# Patient Record
Sex: MALE | Race: WHITE
[De-identification: names, ages, dates, MRNs, and addresses within clinical notes are randomized per-mention and may not be internally consistent; named-entity substitution may affect disease eponyms.]

---

## 2018-10-03 ENCOUNTER — HOSPITAL ENCOUNTER (INPATIENT)
Dept: HOSPITAL 94 - ER | Age: 73
LOS: 6 days | Discharge: HOME | DRG: 329 | End: 2018-10-09
Attending: HOSPITALIST | Admitting: SPECIALIST
Payer: MEDICARE

## 2018-10-03 VITALS — HEIGHT: 67 IN | WEIGHT: 150.31 LBS | BODY MASS INDEX: 23.59 KG/M2

## 2018-10-03 DIAGNOSIS — K56.690: ICD-10-CM

## 2018-10-03 DIAGNOSIS — K56.7: ICD-10-CM

## 2018-10-03 DIAGNOSIS — G40.909: ICD-10-CM

## 2018-10-03 DIAGNOSIS — Z79.82: ICD-10-CM

## 2018-10-03 DIAGNOSIS — J44.9: ICD-10-CM

## 2018-10-03 DIAGNOSIS — Z88.8: ICD-10-CM

## 2018-10-03 DIAGNOSIS — N18.9: ICD-10-CM

## 2018-10-03 DIAGNOSIS — I71.4: ICD-10-CM

## 2018-10-03 DIAGNOSIS — Z71.6: ICD-10-CM

## 2018-10-03 DIAGNOSIS — K55.039: Primary | ICD-10-CM

## 2018-10-03 DIAGNOSIS — I12.9: ICD-10-CM

## 2018-10-03 DIAGNOSIS — F41.9: ICD-10-CM

## 2018-10-03 DIAGNOSIS — F17.200: ICD-10-CM

## 2018-10-03 DIAGNOSIS — E43: ICD-10-CM

## 2018-10-03 PROCEDURE — 86304 IMMUNOASSAY TUMOR CA 125: CPT

## 2018-10-03 PROCEDURE — 93005 ELECTROCARDIOGRAM TRACING: CPT

## 2018-10-03 PROCEDURE — 82378 CARCINOEMBRYONIC ANTIGEN: CPT

## 2018-10-03 PROCEDURE — 96374 THER/PROPH/DIAG INJ IV PUSH: CPT

## 2018-10-03 PROCEDURE — 85610 PROTHROMBIN TIME: CPT

## 2018-10-03 PROCEDURE — 99285 EMERGENCY DEPT VISIT HI MDM: CPT

## 2018-10-03 PROCEDURE — 88307 TISSUE EXAM BY PATHOLOGIST: CPT

## 2018-10-03 PROCEDURE — 36415 COLL VENOUS BLD VENIPUNCTURE: CPT

## 2018-10-03 PROCEDURE — 80053 COMPREHEN METABOLIC PANEL: CPT

## 2018-10-03 PROCEDURE — 87070 CULTURE OTHR SPECIMN AEROBIC: CPT

## 2018-10-03 PROCEDURE — 96375 TX/PRO/DX INJ NEW DRUG ADDON: CPT

## 2018-10-03 PROCEDURE — 85730 THROMBOPLASTIN TIME PARTIAL: CPT

## 2018-10-03 PROCEDURE — 85025 COMPLETE CBC W/AUTO DIFF WBC: CPT

## 2018-10-03 PROCEDURE — 71045 X-RAY EXAM CHEST 1 VIEW: CPT

## 2018-10-04 VITALS — DIASTOLIC BLOOD PRESSURE: 86 MMHG | SYSTOLIC BLOOD PRESSURE: 136 MMHG

## 2018-10-04 VITALS — DIASTOLIC BLOOD PRESSURE: 72 MMHG | SYSTOLIC BLOOD PRESSURE: 130 MMHG

## 2018-10-04 VITALS — DIASTOLIC BLOOD PRESSURE: 81 MMHG | SYSTOLIC BLOOD PRESSURE: 111 MMHG

## 2018-10-04 VITALS — DIASTOLIC BLOOD PRESSURE: 86 MMHG | SYSTOLIC BLOOD PRESSURE: 150 MMHG

## 2018-10-04 VITALS — SYSTOLIC BLOOD PRESSURE: 140 MMHG | DIASTOLIC BLOOD PRESSURE: 87 MMHG

## 2018-10-04 VITALS — SYSTOLIC BLOOD PRESSURE: 149 MMHG | DIASTOLIC BLOOD PRESSURE: 84 MMHG

## 2018-10-04 VITALS — SYSTOLIC BLOOD PRESSURE: 132 MMHG | DIASTOLIC BLOOD PRESSURE: 77 MMHG

## 2018-10-04 VITALS — SYSTOLIC BLOOD PRESSURE: 140 MMHG | DIASTOLIC BLOOD PRESSURE: 84 MMHG

## 2018-10-04 VITALS — DIASTOLIC BLOOD PRESSURE: 77 MMHG | SYSTOLIC BLOOD PRESSURE: 104 MMHG

## 2018-10-04 VITALS — SYSTOLIC BLOOD PRESSURE: 156 MMHG | DIASTOLIC BLOOD PRESSURE: 88 MMHG

## 2018-10-04 VITALS — DIASTOLIC BLOOD PRESSURE: 88 MMHG | SYSTOLIC BLOOD PRESSURE: 153 MMHG

## 2018-10-04 VITALS — SYSTOLIC BLOOD PRESSURE: 127 MMHG | DIASTOLIC BLOOD PRESSURE: 77 MMHG

## 2018-10-04 VITALS — DIASTOLIC BLOOD PRESSURE: 86 MMHG | SYSTOLIC BLOOD PRESSURE: 156 MMHG

## 2018-10-04 VITALS — DIASTOLIC BLOOD PRESSURE: 56 MMHG | SYSTOLIC BLOOD PRESSURE: 103 MMHG

## 2018-10-04 VITALS — SYSTOLIC BLOOD PRESSURE: 129 MMHG | DIASTOLIC BLOOD PRESSURE: 77 MMHG

## 2018-10-04 VITALS — SYSTOLIC BLOOD PRESSURE: 92 MMHG | DIASTOLIC BLOOD PRESSURE: 62 MMHG

## 2018-10-04 VITALS — DIASTOLIC BLOOD PRESSURE: 87 MMHG | SYSTOLIC BLOOD PRESSURE: 151 MMHG

## 2018-10-04 VITALS — DIASTOLIC BLOOD PRESSURE: 93 MMHG | SYSTOLIC BLOOD PRESSURE: 158 MMHG

## 2018-10-04 LAB
ALBUMIN SERPL BCP-MCNC: 2.8 G/DL (ref 3.4–5)
ALBUMIN/GLOB SERPL: 0.9 {RATIO} (ref 1.1–1.5)
ALP SERPL-CCNC: 65 IU/L (ref 46–116)
ALT SERPL W P-5'-P-CCNC: 14 U/L (ref 12–78)
ANION GAP SERPL CALCULATED.3IONS-SCNC: 10 MMOL/L (ref 8–16)
APTT PPP: 29 SECONDS (ref 22–32)
AST SERPL W P-5'-P-CCNC: 13 U/L (ref 10–37)
BASOPHILS # BLD AUTO: 0.1 X10'3 (ref 0–0.2)
BASOPHILS NFR BLD AUTO: 0.5 % (ref 0–1)
BILIRUB SERPL-MCNC: 0.5 MG/DL (ref 0.1–1)
BUN SERPL-MCNC: 11 MG/DL (ref 7–18)
BUN/CREAT SERPL: 10.4 (ref 5.4–32)
CALCIUM SERPL-MCNC: 7.9 MG/DL (ref 8.5–10.1)
CHLORIDE SERPL-SCNC: 111 MMOL/L (ref 99–107)
CO2 SERPL-SCNC: 25.4 MMOL/L (ref 24–32)
CREAT SERPL-MCNC: 1.06 MG/DL (ref 0.6–1.1)
EOSINOPHIL # BLD AUTO: 0.1 X10'3 (ref 0–0.9)
EOSINOPHIL NFR BLD AUTO: 1.2 % (ref 0–6)
ERYTHROCYTE [DISTWIDTH] IN BLOOD BY AUTOMATED COUNT: 13.4 % (ref 11.5–14.5)
GFR SERPL CREATININE-BSD FRML MDRD: 69 ML/MIN
GLUCOSE SERPL-MCNC: 83 MG/DL (ref 70–104)
HCT VFR BLD AUTO: 41.6 % (ref 42–52)
HGB BLD-MCNC: 14.4 G/DL (ref 14–17.9)
INR PPP: 1 INR
LYMPHOCYTES # BLD AUTO: 2 X10'3 (ref 1.1–4.8)
LYMPHOCYTES NFR BLD AUTO: 17.6 % (ref 21–51)
MCH RBC QN AUTO: 32.8 PG (ref 27–31)
MCHC RBC AUTO-ENTMCNC: 34.6 % (ref 33–36.5)
MCV RBC AUTO: 94.7 FL (ref 78–98)
MONOCYTES # BLD AUTO: 1 X10'3 (ref 0–0.9)
MONOCYTES NFR BLD AUTO: 8.9 % (ref 2–12)
NEUTROPHILS # BLD AUTO: 8.1 X10'3 (ref 1.8–7.7)
NEUTROPHILS NFR BLD AUTO: 71.8 % (ref 42–75)
PLATELET # BLD AUTO: 246 X10'3 (ref 140–440)
PMV BLD AUTO: 8.2 FL (ref 7.4–10.4)
POTASSIUM SERPL-SCNC: 4.2 MMOL/L (ref 3.5–5.1)
PROT SERPL-MCNC: 5.9 G/DL (ref 6.4–8.2)
PROTHROMBIN TIME: 10.6 SECONDS (ref 9–12)
RBC # BLD AUTO: 4.39 X10'6 (ref 4.7–6.1)
SODIUM SERPL-SCNC: 146 MMOL/L (ref 135–145)
WBC # BLD AUTO: 11.3 X10'3 (ref 4.5–11)

## 2018-10-04 PROCEDURE — 07TB0ZZ RESECTION OF MESENTERIC LYMPHATIC, OPEN APPROACH: ICD-10-PCS | Performed by: SURGERY

## 2018-10-04 PROCEDURE — 0DTF0ZZ RESECTION OF RIGHT LARGE INTESTINE, OPEN APPROACH: ICD-10-PCS | Performed by: SURGERY

## 2018-10-04 RX ADMIN — SODIUM CHLORIDE SCH MLS/HR: 9 INJECTION INTRAMUSCULAR; INTRAVENOUS; SUBCUTANEOUS at 18:14

## 2018-10-04 RX ADMIN — TAZOBACTAM SODIUM AND PIPERACILLIN SODIUM SCH MLS/HR: 250; 2 INJECTION, SOLUTION INTRAVENOUS at 16:32

## 2018-10-04 RX ADMIN — DIVALPROEX SODIUM SCH MG: 250 TABLET, DELAYED RELEASE ORAL at 07:55

## 2018-10-04 RX ADMIN — TAZOBACTAM SODIUM AND PIPERACILLIN SODIUM SCH MLS/HR: 250; 2 INJECTION, SOLUTION INTRAVENOUS at 07:42

## 2018-10-04 RX ADMIN — HYDROMORPHONE HYDROCHLORIDE PRN MG: 1 INJECTION, SOLUTION INTRAMUSCULAR; INTRAVENOUS; SUBCUTANEOUS at 14:32

## 2018-10-04 RX ADMIN — SODIUM CHLORIDE SCH MLS/HR: 9 INJECTION INTRAMUSCULAR; INTRAVENOUS; SUBCUTANEOUS at 10:49

## 2018-10-04 RX ADMIN — NICOTINE SCH PATCH: 14 PATCH, EXTENDED RELEASE TRANSDERMAL at 21:21

## 2018-10-04 RX ADMIN — HYDROMORPHONE HYDROCHLORIDE PRN MG: 1 INJECTION, SOLUTION INTRAMUSCULAR; INTRAVENOUS; SUBCUTANEOUS at 14:43

## 2018-10-04 RX ADMIN — TAZOBACTAM SODIUM AND PIPERACILLIN SODIUM SCH MLS/HR: 250; 2 INJECTION, SOLUTION INTRAVENOUS at 00:46

## 2018-10-04 RX ADMIN — DOCUSATE SODIUM SCH MG: 100 CAPSULE, LIQUID FILLED ORAL at 21:21

## 2018-10-04 RX ADMIN — SODIUM CHLORIDE SCH MLS/HR: 9 INJECTION INTRAMUSCULAR; INTRAVENOUS; SUBCUTANEOUS at 00:46

## 2018-10-05 VITALS — DIASTOLIC BLOOD PRESSURE: 83 MMHG | SYSTOLIC BLOOD PRESSURE: 146 MMHG

## 2018-10-05 VITALS — SYSTOLIC BLOOD PRESSURE: 131 MMHG | DIASTOLIC BLOOD PRESSURE: 77 MMHG

## 2018-10-05 VITALS — DIASTOLIC BLOOD PRESSURE: 81 MMHG | SYSTOLIC BLOOD PRESSURE: 134 MMHG

## 2018-10-05 VITALS — SYSTOLIC BLOOD PRESSURE: 134 MMHG | DIASTOLIC BLOOD PRESSURE: 81 MMHG

## 2018-10-05 LAB
ALBUMIN SERPL BCP-MCNC: 2.8 G/DL (ref 3.4–5)
ALBUMIN/GLOB SERPL: 0.8 {RATIO} (ref 1.1–1.5)
ALP SERPL-CCNC: 66 IU/L (ref 46–116)
ALT SERPL W P-5'-P-CCNC: 21 U/L (ref 12–78)
ANION GAP SERPL CALCULATED.3IONS-SCNC: 12 MMOL/L (ref 8–16)
AST SERPL W P-5'-P-CCNC: 28 U/L (ref 10–37)
BASOPHILS # BLD AUTO: 0 X10'3 (ref 0–0.2)
BASOPHILS NFR BLD AUTO: 0.2 % (ref 0–1)
BILIRUB SERPL-MCNC: 0.5 MG/DL (ref 0.1–1)
BUN SERPL-MCNC: 13 MG/DL (ref 7–18)
BUN/CREAT SERPL: 11.7 (ref 5.4–32)
CALCIUM SERPL-MCNC: 8.2 MG/DL (ref 8.5–10.1)
CANCER ANTIGEN 125: 14.6 U/ML
CEA SERPL-MCNC: 2.6 NG/ML (ref 0–4.7)
CHLORIDE SERPL-SCNC: 108 MMOL/L (ref 99–107)
CO2 SERPL-SCNC: 22.8 MMOL/L (ref 24–32)
CREAT SERPL-MCNC: 1.11 MG/DL (ref 0.6–1.1)
EOSINOPHIL # BLD AUTO: 0 X10'3 (ref 0–0.9)
EOSINOPHIL NFR BLD AUTO: 0 % (ref 0–6)
ERYTHROCYTE [DISTWIDTH] IN BLOOD BY AUTOMATED COUNT: 13.1 % (ref 11.5–14.5)
GFR SERPL CREATININE-BSD FRML MDRD: 65 ML/MIN
GLUCOSE SERPL-MCNC: 97 MG/DL (ref 70–104)
HCT VFR BLD AUTO: 42 % (ref 42–52)
HGB BLD-MCNC: 14.7 G/DL (ref 14–17.9)
LYMPHOCYTES # BLD AUTO: 0.9 X10'3 (ref 1.1–4.8)
LYMPHOCYTES NFR BLD AUTO: 6.9 % (ref 21–51)
MCH RBC QN AUTO: 33.2 PG (ref 27–31)
MCHC RBC AUTO-ENTMCNC: 34.9 % (ref 33–36.5)
MCV RBC AUTO: 95 FL (ref 78–98)
MONOCYTES # BLD AUTO: 1.1 X10'3 (ref 0–0.9)
MONOCYTES NFR BLD AUTO: 8.3 % (ref 2–12)
NEUTROPHILS # BLD AUTO: 11.6 X10'3 (ref 1.8–7.7)
NEUTROPHILS NFR BLD AUTO: 84.6 % (ref 42–75)
PLATELET # BLD AUTO: 257 X10'3 (ref 140–440)
PMV BLD AUTO: 8.5 FL (ref 7.4–10.4)
POTASSIUM SERPL-SCNC: 4.5 MMOL/L (ref 3.5–5.1)
PROT SERPL-MCNC: 6.1 G/DL (ref 6.4–8.2)
RBC # BLD AUTO: 4.42 X10'6 (ref 4.7–6.1)
SODIUM SERPL-SCNC: 143 MMOL/L (ref 135–145)
WBC # BLD AUTO: 13.6 X10'3 (ref 4.5–11)

## 2018-10-05 RX ADMIN — MORPHINE SULFATE SCH MLS/HR: 50 INJECTION, SOLUTION, CONCENTRATE INTRAVENOUS at 09:00

## 2018-10-05 RX ADMIN — MORPHINE SULFATE SCH MLS/HR: 50 INJECTION, SOLUTION, CONCENTRATE INTRAVENOUS at 19:00

## 2018-10-05 RX ADMIN — MORPHINE SULFATE SCH MLS/HR: 50 INJECTION, SOLUTION, CONCENTRATE INTRAVENOUS at 01:00

## 2018-10-05 RX ADMIN — MORPHINE SULFATE SCH MLS/HR: 50 INJECTION, SOLUTION, CONCENTRATE INTRAVENOUS at 03:00

## 2018-10-05 RX ADMIN — MORPHINE SULFATE SCH MLS/HR: 50 INJECTION, SOLUTION, CONCENTRATE INTRAVENOUS at 15:00

## 2018-10-05 RX ADMIN — SODIUM CHLORIDE SCH MLS/HR: 9 INJECTION INTRAMUSCULAR; INTRAVENOUS; SUBCUTANEOUS at 09:03

## 2018-10-05 RX ADMIN — MORPHINE SULFATE SCH MLS/HR: 50 INJECTION, SOLUTION, CONCENTRATE INTRAVENOUS at 11:00

## 2018-10-05 RX ADMIN — MORPHINE SULFATE SCH MLS/HR: 50 INJECTION, SOLUTION, CONCENTRATE INTRAVENOUS at 23:00

## 2018-10-05 RX ADMIN — DOCUSATE SODIUM SCH MG: 100 CAPSULE, LIQUID FILLED ORAL at 21:05

## 2018-10-05 RX ADMIN — MORPHINE SULFATE SCH MLS/HR: 50 INJECTION, SOLUTION, CONCENTRATE INTRAVENOUS at 07:00

## 2018-10-05 RX ADMIN — TAZOBACTAM SODIUM AND PIPERACILLIN SODIUM SCH MLS/HR: 250; 2 INJECTION, SOLUTION INTRAVENOUS at 00:37

## 2018-10-05 RX ADMIN — MORPHINE SULFATE SCH MLS/HR: 50 INJECTION, SOLUTION, CONCENTRATE INTRAVENOUS at 17:00

## 2018-10-05 RX ADMIN — TAZOBACTAM SODIUM AND PIPERACILLIN SODIUM SCH MLS/HR: 250; 2 INJECTION, SOLUTION INTRAVENOUS at 23:49

## 2018-10-05 RX ADMIN — MORPHINE SULFATE SCH MLS/HR: 50 INJECTION, SOLUTION, CONCENTRATE INTRAVENOUS at 21:00

## 2018-10-05 RX ADMIN — TAZOBACTAM SODIUM AND PIPERACILLIN SODIUM SCH MLS/HR: 250; 2 INJECTION, SOLUTION INTRAVENOUS at 17:41

## 2018-10-05 RX ADMIN — MORPHINE SULFATE SCH MLS/HR: 50 INJECTION, SOLUTION, CONCENTRATE INTRAVENOUS at 13:00

## 2018-10-05 RX ADMIN — MORPHINE SULFATE SCH MLS/HR: 50 INJECTION, SOLUTION, CONCENTRATE INTRAVENOUS at 05:00

## 2018-10-05 RX ADMIN — TAZOBACTAM SODIUM AND PIPERACILLIN SODIUM SCH MLS/HR: 250; 2 INJECTION, SOLUTION INTRAVENOUS at 09:02

## 2018-10-05 RX ADMIN — NICOTINE SCH PATCH: 14 PATCH, EXTENDED RELEASE TRANSDERMAL at 09:03

## 2018-10-05 RX ADMIN — DOCUSATE SODIUM SCH MG: 100 CAPSULE, LIQUID FILLED ORAL at 09:01

## 2018-10-05 RX ADMIN — DIVALPROEX SODIUM SCH MG: 250 TABLET, DELAYED RELEASE ORAL at 09:02

## 2018-10-06 VITALS — DIASTOLIC BLOOD PRESSURE: 87 MMHG | SYSTOLIC BLOOD PRESSURE: 122 MMHG

## 2018-10-06 VITALS — DIASTOLIC BLOOD PRESSURE: 88 MMHG | SYSTOLIC BLOOD PRESSURE: 133 MMHG

## 2018-10-06 VITALS — DIASTOLIC BLOOD PRESSURE: 94 MMHG | SYSTOLIC BLOOD PRESSURE: 134 MMHG

## 2018-10-06 VITALS — DIASTOLIC BLOOD PRESSURE: 88 MMHG | SYSTOLIC BLOOD PRESSURE: 150 MMHG

## 2018-10-06 LAB
ALBUMIN SERPL BCP-MCNC: 2.7 G/DL (ref 3.4–5)
ALBUMIN/GLOB SERPL: 0.8 {RATIO} (ref 1.1–1.5)
ALP SERPL-CCNC: 61 IU/L (ref 46–116)
ALT SERPL W P-5'-P-CCNC: 19 U/L (ref 12–78)
ANION GAP SERPL CALCULATED.3IONS-SCNC: 10 MMOL/L (ref 8–16)
AST SERPL W P-5'-P-CCNC: 29 U/L (ref 10–37)
BASOPHILS # BLD AUTO: 0 X10'3 (ref 0–0.2)
BASOPHILS NFR BLD AUTO: 0.2 % (ref 0–1)
BILIRUB SERPL-MCNC: 0.6 MG/DL (ref 0.1–1)
BUN SERPL-MCNC: 14 MG/DL (ref 7–18)
BUN/CREAT SERPL: 13.5 (ref 5.4–32)
CALCIUM SERPL-MCNC: 8 MG/DL (ref 8.5–10.1)
CHLORIDE SERPL-SCNC: 102 MMOL/L (ref 99–107)
CO2 SERPL-SCNC: 26.3 MMOL/L (ref 24–32)
CREAT SERPL-MCNC: 1.04 MG/DL (ref 0.6–1.1)
EOSINOPHIL # BLD AUTO: 0 X10'3 (ref 0–0.9)
EOSINOPHIL NFR BLD AUTO: 0.2 % (ref 0–6)
ERYTHROCYTE [DISTWIDTH] IN BLOOD BY AUTOMATED COUNT: 12.9 % (ref 11.5–14.5)
GFR SERPL CREATININE-BSD FRML MDRD: 70 ML/MIN
GLUCOSE SERPL-MCNC: 109 MG/DL (ref 70–104)
HCT VFR BLD AUTO: 40.3 % (ref 42–52)
HGB BLD-MCNC: 13.7 G/DL (ref 14–17.9)
LYMPHOCYTES # BLD AUTO: 0.8 X10'3 (ref 1.1–4.8)
LYMPHOCYTES NFR BLD AUTO: 8.9 % (ref 21–51)
MCH RBC QN AUTO: 32.1 PG (ref 27–31)
MCHC RBC AUTO-ENTMCNC: 34 % (ref 33–36.5)
MCV RBC AUTO: 94.5 FL (ref 78–98)
MONOCYTES # BLD AUTO: 1 X10'3 (ref 0–0.9)
MONOCYTES NFR BLD AUTO: 10.3 % (ref 2–12)
NEUTROPHILS # BLD AUTO: 7.5 X10'3 (ref 1.8–7.7)
NEUTROPHILS NFR BLD AUTO: 80.4 % (ref 42–75)
PLATELET # BLD AUTO: 266 X10'3 (ref 140–440)
PMV BLD AUTO: 8.2 FL (ref 7.4–10.4)
POTASSIUM SERPL-SCNC: 4.2 MMOL/L (ref 3.5–5.1)
PROT SERPL-MCNC: 6 G/DL (ref 6.4–8.2)
RBC # BLD AUTO: 4.26 X10'6 (ref 4.7–6.1)
SODIUM SERPL-SCNC: 138 MMOL/L (ref 135–145)
WBC # BLD AUTO: 9.3 X10'3 (ref 4.5–11)

## 2018-10-06 RX ADMIN — MORPHINE SULFATE SCH MLS/HR: 50 INJECTION, SOLUTION, CONCENTRATE INTRAVENOUS at 21:00

## 2018-10-06 RX ADMIN — DOCUSATE SODIUM SCH MG: 100 CAPSULE, LIQUID FILLED ORAL at 20:36

## 2018-10-06 RX ADMIN — TAZOBACTAM SODIUM AND PIPERACILLIN SODIUM SCH MLS/HR: 250; 2 INJECTION, SOLUTION INTRAVENOUS at 23:40

## 2018-10-06 RX ADMIN — MORPHINE SULFATE SCH MLS/HR: 50 INJECTION, SOLUTION, CONCENTRATE INTRAVENOUS at 01:00

## 2018-10-06 RX ADMIN — MORPHINE SULFATE SCH MLS/HR: 50 INJECTION, SOLUTION, CONCENTRATE INTRAVENOUS at 13:00

## 2018-10-06 RX ADMIN — MORPHINE SULFATE SCH MLS/HR: 50 INJECTION, SOLUTION, CONCENTRATE INTRAVENOUS at 07:00

## 2018-10-06 RX ADMIN — MORPHINE SULFATE SCH MLS/HR: 50 INJECTION, SOLUTION, CONCENTRATE INTRAVENOUS at 19:00

## 2018-10-06 RX ADMIN — MORPHINE SULFATE SCH MLS/HR: 50 INJECTION, SOLUTION, CONCENTRATE INTRAVENOUS at 23:00

## 2018-10-06 RX ADMIN — MORPHINE SULFATE SCH MLS/HR: 50 INJECTION, SOLUTION, CONCENTRATE INTRAVENOUS at 03:00

## 2018-10-06 RX ADMIN — TAZOBACTAM SODIUM AND PIPERACILLIN SODIUM SCH MLS/HR: 250; 2 INJECTION, SOLUTION INTRAVENOUS at 08:23

## 2018-10-06 RX ADMIN — NICOTINE SCH PATCH: 14 PATCH, EXTENDED RELEASE TRANSDERMAL at 08:18

## 2018-10-06 RX ADMIN — MORPHINE SULFATE SCH MLS/HR: 50 INJECTION, SOLUTION, CONCENTRATE INTRAVENOUS at 17:00

## 2018-10-06 RX ADMIN — TAZOBACTAM SODIUM AND PIPERACILLIN SODIUM SCH MLS/HR: 250; 2 INJECTION, SOLUTION INTRAVENOUS at 16:18

## 2018-10-06 RX ADMIN — DOCUSATE SODIUM SCH MG: 100 CAPSULE, LIQUID FILLED ORAL at 08:16

## 2018-10-06 RX ADMIN — MORPHINE SULFATE SCH MLS/HR: 50 INJECTION, SOLUTION, CONCENTRATE INTRAVENOUS at 11:00

## 2018-10-06 RX ADMIN — SODIUM CHLORIDE SCH MLS/HR: 9 INJECTION INTRAMUSCULAR; INTRAVENOUS; SUBCUTANEOUS at 18:31

## 2018-10-06 RX ADMIN — MORPHINE SULFATE SCH MLS/HR: 50 INJECTION, SOLUTION, CONCENTRATE INTRAVENOUS at 09:00

## 2018-10-06 RX ADMIN — SODIUM CHLORIDE SCH MLS/HR: 9 INJECTION INTRAMUSCULAR; INTRAVENOUS; SUBCUTANEOUS at 12:47

## 2018-10-06 RX ADMIN — ENOXAPARIN SODIUM SCH MG: 100 INJECTION SUBCUTANEOUS at 12:11

## 2018-10-06 RX ADMIN — DIVALPROEX SODIUM SCH MG: 250 TABLET, DELAYED RELEASE ORAL at 08:16

## 2018-10-06 RX ADMIN — MORPHINE SULFATE SCH MLS/HR: 50 INJECTION, SOLUTION, CONCENTRATE INTRAVENOUS at 15:00

## 2018-10-06 RX ADMIN — MORPHINE SULFATE SCH MLS/HR: 50 INJECTION, SOLUTION, CONCENTRATE INTRAVENOUS at 05:00

## 2018-10-07 VITALS — DIASTOLIC BLOOD PRESSURE: 95 MMHG | SYSTOLIC BLOOD PRESSURE: 161 MMHG

## 2018-10-07 VITALS — DIASTOLIC BLOOD PRESSURE: 93 MMHG | SYSTOLIC BLOOD PRESSURE: 136 MMHG

## 2018-10-07 VITALS — DIASTOLIC BLOOD PRESSURE: 90 MMHG | SYSTOLIC BLOOD PRESSURE: 146 MMHG

## 2018-10-07 VITALS — SYSTOLIC BLOOD PRESSURE: 120 MMHG | DIASTOLIC BLOOD PRESSURE: 88 MMHG

## 2018-10-07 LAB
ALBUMIN SERPL BCP-MCNC: 2.5 G/DL (ref 3.4–5)
ALBUMIN/GLOB SERPL: 0.8 {RATIO} (ref 1.1–1.5)
ALP SERPL-CCNC: 58 IU/L (ref 46–116)
ALT SERPL W P-5'-P-CCNC: 19 U/L (ref 12–78)
ANION GAP SERPL CALCULATED.3IONS-SCNC: 9 MMOL/L (ref 8–16)
AST SERPL W P-5'-P-CCNC: 19 U/L (ref 10–37)
BASOPHILS # BLD AUTO: 0 X10'3 (ref 0–0.2)
BASOPHILS NFR BLD AUTO: 0.2 % (ref 0–1)
BILIRUB SERPL-MCNC: 0.4 MG/DL (ref 0.1–1)
BUN SERPL-MCNC: 27 MG/DL (ref 7–18)
BUN/CREAT SERPL: 29 (ref 5.4–32)
CALCIUM SERPL-MCNC: 7.9 MG/DL (ref 8.5–10.1)
CHLORIDE SERPL-SCNC: 100 MMOL/L (ref 99–107)
CO2 SERPL-SCNC: 25.8 MMOL/L (ref 24–32)
CREAT SERPL-MCNC: 0.93 MG/DL (ref 0.6–1.1)
EOSINOPHIL # BLD AUTO: 0 X10'3 (ref 0–0.9)
EOSINOPHIL NFR BLD AUTO: 0.1 % (ref 0–6)
ERYTHROCYTE [DISTWIDTH] IN BLOOD BY AUTOMATED COUNT: 12.8 % (ref 11.5–14.5)
GFR SERPL CREATININE-BSD FRML MDRD: 80 ML/MIN
GLUCOSE SERPL-MCNC: 121 MG/DL (ref 70–104)
HCT VFR BLD AUTO: 43 % (ref 42–52)
HGB BLD-MCNC: 14.7 G/DL (ref 14–17.9)
LYMPHOCYTES # BLD AUTO: 0.8 X10'3 (ref 1.1–4.8)
LYMPHOCYTES NFR BLD AUTO: 9 % (ref 21–51)
MCH RBC QN AUTO: 32 PG (ref 27–31)
MCHC RBC AUTO-ENTMCNC: 34.1 % (ref 33–36.5)
MCV RBC AUTO: 93.6 FL (ref 78–98)
MONOCYTES # BLD AUTO: 1.2 X10'3 (ref 0–0.9)
MONOCYTES NFR BLD AUTO: 12.8 % (ref 2–12)
NEUTROPHILS # BLD AUTO: 7.1 X10'3 (ref 1.8–7.7)
NEUTROPHILS NFR BLD AUTO: 77.9 % (ref 42–75)
PLATELET # BLD AUTO: 302 X10'3 (ref 140–440)
PMV BLD AUTO: 8.3 FL (ref 7.4–10.4)
POTASSIUM SERPL-SCNC: 4.1 MMOL/L (ref 3.5–5.1)
PROT SERPL-MCNC: 5.7 G/DL (ref 6.4–8.2)
RBC # BLD AUTO: 4.59 X10'6 (ref 4.7–6.1)
SODIUM SERPL-SCNC: 135 MMOL/L (ref 135–145)
WBC # BLD AUTO: 9.1 X10'3 (ref 4.5–11)

## 2018-10-07 RX ADMIN — MORPHINE SULFATE SCH MLS/HR: 50 INJECTION, SOLUTION, CONCENTRATE INTRAVENOUS at 19:00

## 2018-10-07 RX ADMIN — NICOTINE SCH PATCH: 14 PATCH, EXTENDED RELEASE TRANSDERMAL at 07:57

## 2018-10-07 RX ADMIN — METOCLOPRAMIDE SCH MG: 5 INJECTION, SOLUTION INTRAMUSCULAR; INTRAVENOUS at 14:40

## 2018-10-07 RX ADMIN — SODIUM CHLORIDE SCH MLS/HR: 9 INJECTION INTRAMUSCULAR; INTRAVENOUS; SUBCUTANEOUS at 05:23

## 2018-10-07 RX ADMIN — MORPHINE SULFATE SCH MLS/HR: 50 INJECTION, SOLUTION, CONCENTRATE INTRAVENOUS at 07:00

## 2018-10-07 RX ADMIN — ENOXAPARIN SODIUM SCH MG: 100 INJECTION SUBCUTANEOUS at 07:58

## 2018-10-07 RX ADMIN — MORPHINE SULFATE SCH MLS/HR: 50 INJECTION, SOLUTION, CONCENTRATE INTRAVENOUS at 01:00

## 2018-10-07 RX ADMIN — MORPHINE SULFATE SCH MLS/HR: 50 INJECTION, SOLUTION, CONCENTRATE INTRAVENOUS at 21:00

## 2018-10-07 RX ADMIN — MORPHINE SULFATE SCH MLS/HR: 50 INJECTION, SOLUTION, CONCENTRATE INTRAVENOUS at 05:00

## 2018-10-07 RX ADMIN — TAZOBACTAM SODIUM AND PIPERACILLIN SODIUM SCH MLS/HR: 250; 2 INJECTION, SOLUTION INTRAVENOUS at 07:58

## 2018-10-07 RX ADMIN — MORPHINE SULFATE SCH MLS/HR: 50 INJECTION, SOLUTION, CONCENTRATE INTRAVENOUS at 03:00

## 2018-10-07 RX ADMIN — MORPHINE SULFATE SCH MLS/HR: 50 INJECTION, SOLUTION, CONCENTRATE INTRAVENOUS at 17:00

## 2018-10-07 RX ADMIN — MORPHINE SULFATE SCH MLS/HR: 50 INJECTION, SOLUTION, CONCENTRATE INTRAVENOUS at 09:00

## 2018-10-07 RX ADMIN — MORPHINE SULFATE SCH MLS/HR: 50 INJECTION, SOLUTION, CONCENTRATE INTRAVENOUS at 13:00

## 2018-10-07 RX ADMIN — SODIUM CHLORIDE SCH MLS/HR: 9 INJECTION INTRAMUSCULAR; INTRAVENOUS; SUBCUTANEOUS at 19:46

## 2018-10-07 RX ADMIN — DOCUSATE SODIUM SCH MG: 100 CAPSULE, LIQUID FILLED ORAL at 07:57

## 2018-10-07 RX ADMIN — DIVALPROEX SODIUM SCH MG: 250 TABLET, DELAYED RELEASE ORAL at 07:58

## 2018-10-07 RX ADMIN — DOCUSATE SODIUM SCH MG: 100 CAPSULE, LIQUID FILLED ORAL at 19:49

## 2018-10-07 RX ADMIN — MORPHINE SULFATE SCH MLS/HR: 50 INJECTION, SOLUTION, CONCENTRATE INTRAVENOUS at 23:00

## 2018-10-07 RX ADMIN — MORPHINE SULFATE SCH MLS/HR: 50 INJECTION, SOLUTION, CONCENTRATE INTRAVENOUS at 11:00

## 2018-10-07 RX ADMIN — MORPHINE SULFATE SCH MLS/HR: 50 INJECTION, SOLUTION, CONCENTRATE INTRAVENOUS at 15:00

## 2018-10-07 RX ADMIN — TAZOBACTAM SODIUM AND PIPERACILLIN SODIUM SCH MLS/HR: 250; 2 INJECTION, SOLUTION INTRAVENOUS at 16:24

## 2018-10-07 RX ADMIN — METOCLOPRAMIDE SCH MG: 5 INJECTION, SOLUTION INTRAMUSCULAR; INTRAVENOUS at 19:48

## 2018-10-08 VITALS — DIASTOLIC BLOOD PRESSURE: 84 MMHG | SYSTOLIC BLOOD PRESSURE: 138 MMHG

## 2018-10-08 VITALS — SYSTOLIC BLOOD PRESSURE: 146 MMHG | DIASTOLIC BLOOD PRESSURE: 89 MMHG

## 2018-10-08 VITALS — SYSTOLIC BLOOD PRESSURE: 143 MMHG | DIASTOLIC BLOOD PRESSURE: 92 MMHG

## 2018-10-08 VITALS — DIASTOLIC BLOOD PRESSURE: 86 MMHG | SYSTOLIC BLOOD PRESSURE: 142 MMHG

## 2018-10-08 LAB
ALBUMIN SERPL BCP-MCNC: 2.4 G/DL (ref 3.4–5)
ALBUMIN/GLOB SERPL: 0.8 {RATIO} (ref 1.1–1.5)
ALP SERPL-CCNC: 50 IU/L (ref 46–116)
ALT SERPL W P-5'-P-CCNC: 19 U/L (ref 12–78)
ANION GAP SERPL CALCULATED.3IONS-SCNC: 11 MMOL/L (ref 8–16)
AST SERPL W P-5'-P-CCNC: 17 U/L (ref 10–37)
BASOPHILS # BLD AUTO: (no result) X10'3 (ref 0–0.2)
BASOPHILS NFR BLD AUTO: (no result) % (ref 0–1)
BILIRUB SERPL-MCNC: 0.4 MG/DL (ref 0.1–1)
BUN SERPL-MCNC: 28 MG/DL (ref 7–18)
BUN/CREAT SERPL: 29.2 (ref 5.4–32)
CALCIUM SERPL-MCNC: 7.6 MG/DL (ref 8.5–10.1)
CHLORIDE SERPL-SCNC: 102 MMOL/L (ref 99–107)
CO2 SERPL-SCNC: 25.1 MMOL/L (ref 24–32)
CREAT SERPL-MCNC: 0.96 MG/DL (ref 0.6–1.1)
EOSINOPHIL # BLD AUTO: (no result) X10'3 (ref 0–0.9)
EOSINOPHIL NFR BLD AUTO: (no result) % (ref 0–6)
ERYTHROCYTE [DISTWIDTH] IN BLOOD BY AUTOMATED COUNT: 12.6 % (ref 11.5–14.5)
GFR SERPL CREATININE-BSD FRML MDRD: 77 ML/MIN
GLUCOSE SERPL-MCNC: 113 MG/DL (ref 70–104)
HCT VFR BLD AUTO: 40 % (ref 42–52)
HGB BLD-MCNC: 14 G/DL (ref 14–17.9)
LG PLATELETS BLD QL SMEAR: (no result)
LYMPHOCYTES # BLD AUTO: (no result) X10'3 (ref 1.1–4.8)
LYMPHOCYTES NFR BLD AUTO: (no result) % (ref 21–51)
LYMPHOCYTES NFR BLD MANUAL: 10 % (ref 21–51)
MCH RBC QN AUTO: 32.8 PG (ref 27–31)
MCHC RBC AUTO-ENTMCNC: 34.9 % (ref 33–36.5)
MCV RBC AUTO: 94 FL (ref 78–98)
METAMYELOCYTES NFR BLD MANUAL: 2 % (ref 0–0)
MONOCYTES # BLD AUTO: (no result) X10'3 (ref 0–0.9)
MONOCYTES NFR BLD AUTO: (no result) % (ref 2–12)
MONOCYTES NFR BLD MANUAL: 31 % (ref 2–12)
NEUTROPHILS # BLD AUTO: (no result) X10'3 (ref 1.8–7.7)
NEUTROPHILS NFR BLD AUTO: (no result) % (ref 42–75)
NEUTS BAND # BLD MANUAL: 52 % (ref 0–10)
NEUTS SEG NFR BLD MANUAL: 5 % (ref 42–75)
PLATELET # BLD AUTO: 300 X10'3 (ref 140–440)
PLATELET BLD QL SMEAR: NORMAL
PMV BLD AUTO: 7.9 FL (ref 7.4–10.4)
POTASSIUM SERPL-SCNC: 4.1 MMOL/L (ref 3.5–5.1)
PROT SERPL-MCNC: 5.4 G/DL (ref 6.4–8.2)
RBC # BLD AUTO: 4.26 X10'6 (ref 4.7–6.1)
RBC MORPH BLD: NORMAL
SODIUM SERPL-SCNC: 138 MMOL/L (ref 135–145)
TOTAL CELLS COUNTED FLD: 100
TOXIC GRANULES BLD QL SMEAR: (no result)
WBC # BLD AUTO: 3.9 X10'3 (ref 4.5–11)

## 2018-10-08 RX ADMIN — MORPHINE SULFATE SCH MLS/HR: 50 INJECTION, SOLUTION, CONCENTRATE INTRAVENOUS at 05:00

## 2018-10-08 RX ADMIN — DOCUSATE SODIUM SCH MG: 100 CAPSULE, LIQUID FILLED ORAL at 19:56

## 2018-10-08 RX ADMIN — MORPHINE SULFATE SCH MLS/HR: 50 INJECTION, SOLUTION, CONCENTRATE INTRAVENOUS at 11:00

## 2018-10-08 RX ADMIN — ENOXAPARIN SODIUM SCH MG: 100 INJECTION SUBCUTANEOUS at 07:35

## 2018-10-08 RX ADMIN — MORPHINE SULFATE SCH MLS/HR: 50 INJECTION, SOLUTION, CONCENTRATE INTRAVENOUS at 01:00

## 2018-10-08 RX ADMIN — METOCLOPRAMIDE SCH MG: 5 INJECTION, SOLUTION INTRAMUSCULAR; INTRAVENOUS at 07:33

## 2018-10-08 RX ADMIN — MORPHINE SULFATE SCH MLS/HR: 50 INJECTION, SOLUTION, CONCENTRATE INTRAVENOUS at 17:00

## 2018-10-08 RX ADMIN — DIVALPROEX SODIUM SCH MG: 250 TABLET, DELAYED RELEASE ORAL at 07:36

## 2018-10-08 RX ADMIN — SODIUM CHLORIDE SCH MLS/HR: 9 INJECTION INTRAMUSCULAR; INTRAVENOUS; SUBCUTANEOUS at 09:36

## 2018-10-08 RX ADMIN — DOCUSATE SODIUM SCH MG: 100 CAPSULE, LIQUID FILLED ORAL at 07:33

## 2018-10-08 RX ADMIN — TAZOBACTAM SODIUM AND PIPERACILLIN SODIUM SCH MLS/HR: 250; 2 INJECTION, SOLUTION INTRAVENOUS at 07:33

## 2018-10-08 RX ADMIN — TAZOBACTAM SODIUM AND PIPERACILLIN SODIUM SCH MLS/HR: 250; 2 INJECTION, SOLUTION INTRAVENOUS at 23:34

## 2018-10-08 RX ADMIN — MORPHINE SULFATE SCH MLS/HR: 50 INJECTION, SOLUTION, CONCENTRATE INTRAVENOUS at 09:00

## 2018-10-08 RX ADMIN — MORPHINE SULFATE SCH MLS/HR: 50 INJECTION, SOLUTION, CONCENTRATE INTRAVENOUS at 07:00

## 2018-10-08 RX ADMIN — METOCLOPRAMIDE SCH MG: 5 INJECTION, SOLUTION INTRAMUSCULAR; INTRAVENOUS at 01:33

## 2018-10-08 RX ADMIN — MORPHINE SULFATE SCH MLS/HR: 50 INJECTION, SOLUTION, CONCENTRATE INTRAVENOUS at 21:00

## 2018-10-08 RX ADMIN — MORPHINE SULFATE SCH MLS/HR: 50 INJECTION, SOLUTION, CONCENTRATE INTRAVENOUS at 13:00

## 2018-10-08 RX ADMIN — MORPHINE SULFATE SCH MLS/HR: 50 INJECTION, SOLUTION, CONCENTRATE INTRAVENOUS at 15:00

## 2018-10-08 RX ADMIN — TAZOBACTAM SODIUM AND PIPERACILLIN SODIUM SCH MLS/HR: 250; 2 INJECTION, SOLUTION INTRAVENOUS at 15:04

## 2018-10-08 RX ADMIN — MORPHINE SULFATE SCH MLS/HR: 50 INJECTION, SOLUTION, CONCENTRATE INTRAVENOUS at 03:00

## 2018-10-08 RX ADMIN — MORPHINE SULFATE SCH MLS/HR: 50 INJECTION, SOLUTION, CONCENTRATE INTRAVENOUS at 19:00

## 2018-10-08 RX ADMIN — MORPHINE SULFATE SCH MLS/HR: 50 INJECTION, SOLUTION, CONCENTRATE INTRAVENOUS at 23:00

## 2018-10-08 RX ADMIN — METOCLOPRAMIDE SCH MG: 5 INJECTION, SOLUTION INTRAMUSCULAR; INTRAVENOUS at 19:55

## 2018-10-08 RX ADMIN — TAZOBACTAM SODIUM AND PIPERACILLIN SODIUM SCH MLS/HR: 250; 2 INJECTION, SOLUTION INTRAVENOUS at 00:33

## 2018-10-08 RX ADMIN — NICOTINE SCH PATCH: 14 PATCH, EXTENDED RELEASE TRANSDERMAL at 07:35

## 2018-10-08 RX ADMIN — SODIUM CHLORIDE SCH MLS/HR: 9 INJECTION INTRAMUSCULAR; INTRAVENOUS; SUBCUTANEOUS at 21:47

## 2018-10-08 RX ADMIN — METOCLOPRAMIDE SCH MG: 5 INJECTION, SOLUTION INTRAMUSCULAR; INTRAVENOUS at 14:43

## 2018-10-09 VITALS — DIASTOLIC BLOOD PRESSURE: 84 MMHG | SYSTOLIC BLOOD PRESSURE: 133 MMHG

## 2018-10-09 VITALS — SYSTOLIC BLOOD PRESSURE: 138 MMHG | DIASTOLIC BLOOD PRESSURE: 81 MMHG

## 2018-10-09 RX ADMIN — NICOTINE SCH PATCH: 14 PATCH, EXTENDED RELEASE TRANSDERMAL at 07:42

## 2018-10-09 RX ADMIN — MORPHINE SULFATE SCH MLS/HR: 50 INJECTION, SOLUTION, CONCENTRATE INTRAVENOUS at 05:00

## 2018-10-09 RX ADMIN — ENOXAPARIN SODIUM SCH MG: 100 INJECTION SUBCUTANEOUS at 07:43

## 2018-10-09 RX ADMIN — METOCLOPRAMIDE SCH MG: 5 INJECTION, SOLUTION INTRAMUSCULAR; INTRAVENOUS at 01:54

## 2018-10-09 RX ADMIN — DIVALPROEX SODIUM SCH MG: 250 TABLET, DELAYED RELEASE ORAL at 07:42

## 2018-10-09 RX ADMIN — MORPHINE SULFATE SCH MLS/HR: 50 INJECTION, SOLUTION, CONCENTRATE INTRAVENOUS at 09:00

## 2018-10-09 RX ADMIN — MORPHINE SULFATE SCH MLS/HR: 50 INJECTION, SOLUTION, CONCENTRATE INTRAVENOUS at 03:00

## 2018-10-09 RX ADMIN — DOCUSATE SODIUM SCH MG: 100 CAPSULE, LIQUID FILLED ORAL at 07:42

## 2018-10-09 RX ADMIN — MORPHINE SULFATE SCH MLS/HR: 50 INJECTION, SOLUTION, CONCENTRATE INTRAVENOUS at 01:00

## 2018-10-09 RX ADMIN — MORPHINE SULFATE SCH MLS/HR: 50 INJECTION, SOLUTION, CONCENTRATE INTRAVENOUS at 07:00

## 2018-10-09 RX ADMIN — MORPHINE SULFATE SCH MLS/HR: 50 INJECTION, SOLUTION, CONCENTRATE INTRAVENOUS at 13:00

## 2018-10-09 RX ADMIN — METOCLOPRAMIDE SCH MG: 5 INJECTION, SOLUTION INTRAMUSCULAR; INTRAVENOUS at 07:42

## 2018-10-09 RX ADMIN — MORPHINE SULFATE SCH MLS/HR: 50 INJECTION, SOLUTION, CONCENTRATE INTRAVENOUS at 11:00

## 2018-10-09 RX ADMIN — TAZOBACTAM SODIUM AND PIPERACILLIN SODIUM SCH MLS/HR: 250; 2 INJECTION, SOLUTION INTRAVENOUS at 07:52

## 2022-11-17 RX ORDER — ACETAMINOPHEN 325 MG/1
650 TABLET ORAL EVERY 6 HOURS PRN
Status: DISCONTINUED | OUTPATIENT
Start: 2022-11-17 | End: 2022-11-19

## 2022-11-18 ENCOUNTER — HOSPITAL ENCOUNTER (INPATIENT)
Facility: MEDICAL CENTER | Age: 77
LOS: 10 days | DRG: 329 | End: 2022-11-28
Attending: INTERNAL MEDICINE | Admitting: INTERNAL MEDICINE
Payer: MEDICARE

## 2022-11-18 DIAGNOSIS — K56.2 VOLVULUS OF INTESTINE (HCC): ICD-10-CM

## 2022-11-18 DIAGNOSIS — J96.01 ACUTE HYPOXEMIC RESPIRATORY FAILURE (HCC): ICD-10-CM

## 2022-11-18 DIAGNOSIS — Z98.890 S/P EXPLORATORY LAPAROTOMY: ICD-10-CM

## 2022-11-18 DIAGNOSIS — K56.609 SBO (SMALL BOWEL OBSTRUCTION) (HCC): ICD-10-CM

## 2022-11-18 DIAGNOSIS — J69.0 ASPIRATION PNEUMONIA OF RIGHT LOWER LOBE DUE TO GASTRIC SECRETIONS (HCC): ICD-10-CM

## 2022-11-18 PROBLEM — F41.9 ANXIETY: Status: ACTIVE | Noted: 2022-11-18

## 2022-11-18 PROBLEM — D72.829 LEUKOCYTOSIS: Status: ACTIVE | Noted: 2022-11-18

## 2022-11-18 PROBLEM — G40.909 SEIZURE DISORDER (HCC): Status: ACTIVE | Noted: 2022-11-18

## 2022-11-18 PROBLEM — J44.9 COPD (CHRONIC OBSTRUCTIVE PULMONARY DISEASE) (HCC): Status: ACTIVE | Noted: 2022-11-18

## 2022-11-18 PROBLEM — E78.5 DYSLIPIDEMIA: Status: ACTIVE | Noted: 2022-11-18

## 2022-11-18 LAB
ALBUMIN SERPL BCP-MCNC: 3.3 G/DL (ref 3.2–4.9)
ALBUMIN/GLOB SERPL: 1.4 G/DL
ALP SERPL-CCNC: 52 U/L (ref 30–99)
ALT SERPL-CCNC: 11 U/L (ref 2–50)
ANION GAP SERPL CALC-SCNC: 11 MMOL/L (ref 7–16)
AST SERPL-CCNC: 13 U/L (ref 12–45)
BASOPHILS # BLD AUTO: 0.2 % (ref 0–1.8)
BASOPHILS # BLD: 0.02 K/UL (ref 0–0.12)
BILIRUB SERPL-MCNC: 0.6 MG/DL (ref 0.1–1.5)
BUN SERPL-MCNC: 24 MG/DL (ref 8–22)
CALCIUM SERPL-MCNC: 9.1 MG/DL (ref 8.4–10.2)
CHLORIDE SERPL-SCNC: 103 MMOL/L (ref 96–112)
CO2 SERPL-SCNC: 24 MMOL/L (ref 20–33)
CREAT SERPL-MCNC: 0.94 MG/DL (ref 0.5–1.4)
EOSINOPHIL # BLD AUTO: 0 K/UL (ref 0–0.51)
EOSINOPHIL NFR BLD: 0 % (ref 0–6.9)
ERYTHROCYTE [DISTWIDTH] IN BLOOD BY AUTOMATED COUNT: 46.3 FL (ref 35.9–50)
GFR SERPLBLD CREATININE-BSD FMLA CKD-EPI: 84 ML/MIN/1.73 M 2
GLOBULIN SER CALC-MCNC: 2.4 G/DL (ref 1.9–3.5)
GLUCOSE SERPL-MCNC: 118 MG/DL (ref 65–99)
HCT VFR BLD AUTO: 46.4 % (ref 42–52)
HGB BLD-MCNC: 16.1 G/DL (ref 14–18)
IMM GRANULOCYTES # BLD AUTO: 0.02 K/UL (ref 0–0.11)
IMM GRANULOCYTES NFR BLD AUTO: 0.2 % (ref 0–0.9)
LYMPHOCYTES # BLD AUTO: 0.6 K/UL (ref 1–4.8)
LYMPHOCYTES NFR BLD: 5.7 % (ref 22–41)
MCH RBC QN AUTO: 33.4 PG (ref 27–33)
MCHC RBC AUTO-ENTMCNC: 34.7 G/DL (ref 33.7–35.3)
MCV RBC AUTO: 96.3 FL (ref 81.4–97.8)
MONOCYTES # BLD AUTO: 1.34 K/UL (ref 0–0.85)
MONOCYTES NFR BLD AUTO: 12.8 % (ref 0–13.4)
NEUTROPHILS # BLD AUTO: 8.51 K/UL (ref 1.82–7.42)
NEUTROPHILS NFR BLD: 81.1 % (ref 44–72)
NRBC # BLD AUTO: 0 K/UL
NRBC BLD-RTO: 0 /100 WBC
PLATELET # BLD AUTO: 229 K/UL (ref 164–446)
PMV BLD AUTO: 9.6 FL (ref 9–12.9)
POTASSIUM SERPL-SCNC: 4.7 MMOL/L (ref 3.6–5.5)
PROT SERPL-MCNC: 5.7 G/DL (ref 6–8.2)
RBC # BLD AUTO: 4.82 M/UL (ref 4.7–6.1)
SODIUM SERPL-SCNC: 138 MMOL/L (ref 135–145)
WBC # BLD AUTO: 10.5 K/UL (ref 4.8–10.8)

## 2022-11-18 PROCEDURE — 770006 HCHG ROOM/CARE - MED/SURG/GYN SEMI*

## 2022-11-18 PROCEDURE — 700105 HCHG RX REV CODE 258: Performed by: INTERNAL MEDICINE

## 2022-11-18 PROCEDURE — 80053 COMPREHEN METABOLIC PANEL: CPT

## 2022-11-18 PROCEDURE — 36415 COLL VENOUS BLD VENIPUNCTURE: CPT

## 2022-11-18 PROCEDURE — 85025 COMPLETE CBC W/AUTO DIFF WBC: CPT

## 2022-11-18 PROCEDURE — 99223 1ST HOSP IP/OBS HIGH 75: CPT | Mod: AI | Performed by: INTERNAL MEDICINE

## 2022-11-18 PROCEDURE — 700111 HCHG RX REV CODE 636 W/ 250 OVERRIDE (IP): Performed by: INTERNAL MEDICINE

## 2022-11-18 RX ORDER — ATORVASTATIN CALCIUM 20 MG/1
20 TABLET, FILM COATED ORAL NIGHTLY
COMMUNITY

## 2022-11-18 RX ORDER — ONDANSETRON 4 MG/1
4 TABLET, ORALLY DISINTEGRATING ORAL EVERY 4 HOURS PRN
Status: DISCONTINUED | OUTPATIENT
Start: 2022-11-18 | End: 2022-11-19

## 2022-11-18 RX ORDER — HYDROCODONE BITARTRATE AND ACETAMINOPHEN 5; 325 MG/1; MG/1
1 TABLET ORAL EVERY 6 HOURS PRN
Status: ON HOLD | COMMUNITY
End: 2022-11-28

## 2022-11-18 RX ORDER — LEVETIRACETAM 500 MG/5ML
500 INJECTION, SOLUTION, CONCENTRATE INTRAVENOUS EVERY 12 HOURS
Status: DISCONTINUED | OUTPATIENT
Start: 2022-11-18 | End: 2022-11-26

## 2022-11-18 RX ORDER — SODIUM CHLORIDE 9 MG/ML
INJECTION, SOLUTION INTRAVENOUS CONTINUOUS
Status: DISCONTINUED | OUTPATIENT
Start: 2022-11-18 | End: 2022-11-22

## 2022-11-18 RX ORDER — CLONAZEPAM 0.5 MG/1
0.5 TABLET ORAL 2 TIMES DAILY
COMMUNITY

## 2022-11-18 RX ORDER — ONDANSETRON 2 MG/ML
4 INJECTION INTRAMUSCULAR; INTRAVENOUS EVERY 4 HOURS PRN
Status: DISCONTINUED | OUTPATIENT
Start: 2022-11-18 | End: 2022-11-28 | Stop reason: HOSPADM

## 2022-11-18 RX ORDER — LABETALOL HYDROCHLORIDE 5 MG/ML
10 INJECTION, SOLUTION INTRAVENOUS EVERY 4 HOURS PRN
Status: DISCONTINUED | OUTPATIENT
Start: 2022-11-18 | End: 2022-11-28 | Stop reason: HOSPADM

## 2022-11-18 RX ORDER — LORAZEPAM 2 MG/ML
0.5 INJECTION INTRAMUSCULAR EVERY 4 HOURS PRN
Status: DISCONTINUED | OUTPATIENT
Start: 2022-11-18 | End: 2022-11-28 | Stop reason: HOSPADM

## 2022-11-18 RX ORDER — DIVALPROEX SODIUM 250 MG/1
250 TABLET, DELAYED RELEASE ORAL DAILY
COMMUNITY

## 2022-11-18 RX ORDER — HYDROMORPHONE HYDROCHLORIDE 1 MG/ML
0.5 INJECTION, SOLUTION INTRAMUSCULAR; INTRAVENOUS; SUBCUTANEOUS
Status: DISCONTINUED | OUTPATIENT
Start: 2022-11-18 | End: 2022-11-28 | Stop reason: HOSPADM

## 2022-11-18 RX ORDER — AMOXICILLIN 500 MG/1
500 CAPSULE ORAL 3 TIMES DAILY
Status: ON HOLD | COMMUNITY
End: 2022-11-28

## 2022-11-18 RX ORDER — CHLORHEXIDINE GLUCONATE ORAL RINSE 1.2 MG/ML
15 SOLUTION DENTAL 2 TIMES DAILY
COMMUNITY

## 2022-11-18 RX ADMIN — SODIUM CHLORIDE: 9 INJECTION, SOLUTION INTRAVENOUS at 06:00

## 2022-11-18 RX ADMIN — LEVETIRACETAM 500 MG: 100 INJECTION, SOLUTION INTRAVENOUS at 21:26

## 2022-11-18 RX ADMIN — SODIUM CHLORIDE: 9 INJECTION, SOLUTION INTRAVENOUS at 15:35

## 2022-11-18 RX ADMIN — LEVETIRACETAM 500 MG: 100 INJECTION, SOLUTION INTRAVENOUS at 09:37

## 2022-11-18 ASSESSMENT — PAIN DESCRIPTION - PAIN TYPE
TYPE: ACUTE PAIN
TYPE: ACUTE PAIN

## 2022-11-18 ASSESSMENT — ENCOUNTER SYMPTOMS
WEAKNESS: 0
VOMITING: 0
PALPITATIONS: 0
CLAUDICATION: 0
TINGLING: 0
STRIDOR: 0
CHILLS: 0
DIARRHEA: 0
SHORTNESS OF BREATH: 0
SPEECH CHANGE: 0
SENSORY CHANGE: 0
FALLS: 0
CONSTIPATION: 0
NAUSEA: 1
NAUSEA: 0
DIZZINESS: 0
COUGH: 0
DEPRESSION: 0
SORE THROAT: 0
PHOTOPHOBIA: 0
MYALGIAS: 0
FEVER: 0
BLURRED VISION: 0
LOSS OF CONSCIOUSNESS: 0
NERVOUS/ANXIOUS: 0
VOMITING: 1
HEADACHES: 0
INSOMNIA: 0
ABDOMINAL PAIN: 1
SPUTUM PRODUCTION: 0
HEARTBURN: 0

## 2022-11-18 ASSESSMENT — COGNITIVE AND FUNCTIONAL STATUS - GENERAL
PERSONAL GROOMING: A LITTLE
HELP NEEDED FOR BATHING: A LITTLE
TURNING FROM BACK TO SIDE WHILE IN FLAT BAD: A LITTLE
DRESSING REGULAR LOWER BODY CLOTHING: A LITTLE
MOVING TO AND FROM BED TO CHAIR: A LITTLE
SUGGESTED CMS G CODE MODIFIER DAILY ACTIVITY: CK
TOILETING: A LITTLE
CLIMB 3 TO 5 STEPS WITH RAILING: A LOT
DAILY ACTIVITIY SCORE: 18
SUGGESTED CMS G CODE MODIFIER MOBILITY: CL
DRESSING REGULAR UPPER BODY CLOTHING: A LITTLE
WALKING IN HOSPITAL ROOM: A LOT
MOVING FROM LYING ON BACK TO SITTING ON SIDE OF FLAT BED: A LOT
MOBILITY SCORE: 14
STANDING UP FROM CHAIR USING ARMS: A LOT
EATING MEALS: A LITTLE

## 2022-11-18 ASSESSMENT — PATIENT HEALTH QUESTIONNAIRE - PHQ9
SUM OF ALL RESPONSES TO PHQ9 QUESTIONS 1 AND 2: 0
2. FEELING DOWN, DEPRESSED, IRRITABLE, OR HOPELESS: NOT AT ALL
2. FEELING DOWN, DEPRESSED, IRRITABLE, OR HOPELESS: NOT AT ALL
1. LITTLE INTEREST OR PLEASURE IN DOING THINGS: NOT AT ALL
1. LITTLE INTEREST OR PLEASURE IN DOING THINGS: NOT AT ALL
SUM OF ALL RESPONSES TO PHQ9 QUESTIONS 1 AND 2: 0

## 2022-11-18 ASSESSMENT — LIFESTYLE VARIABLES
TOTAL SCORE: 0
AVERAGE NUMBER OF DAYS PER WEEK YOU HAVE A DRINK CONTAINING ALCOHOL: 5
EVER HAD A DRINK FIRST THING IN THE MORNING TO STEADY YOUR NERVES TO GET RID OF A HANGOVER: NO
HAVE YOU EVER FELT YOU SHOULD CUT DOWN ON YOUR DRINKING: NO
HOW MANY TIMES IN THE PAST YEAR HAVE YOU HAD 5 OR MORE DRINKS IN A DAY: 0
TOTAL SCORE: 0
CONSUMPTION TOTAL: NEGATIVE
EVER FELT BAD OR GUILTY ABOUT YOUR DRINKING: NO
HAVE PEOPLE ANNOYED YOU BY CRITICIZING YOUR DRINKING: NO
ALCOHOL_USE: YES
TOTAL SCORE: 0
ON A TYPICAL DAY WHEN YOU DRINK ALCOHOL HOW MANY DRINKS DO YOU HAVE: 1

## 2022-11-18 NOTE — PROGRESS NOTES
Hospital Medicine Daily Progress Note    Date of Service  11/18/2022    Chief Complaint  Hernán Walters is a 77 y.o. male admitted 11/18/2022 with abdominal pain, nausea and vomiting.     Hospital Course  Hernán Walters is a 77 y.o. male who presented 11/18/2022 with abdominal pain.  Patient states his pain initially started approximately a week ago however has been worsening.  He states its in his entire abdomen, sharp at times, can be severe.  He also states he began having nausea and vomiting and can no longer keep anything down.  Because of the ongoing nature of this and the worsening nature, he presented to the ER at an outlying facility.  There they did imaging and noted a small bowel obstruction.  Patient was transferred here for higher level of care.  Patient noted no bleeding.  He does have a history of appendectomy and hemicolectomy approximately 4 years ago.  Patient states he did have a tooth pulled and has been antibiotics on this for approximately 7 days.  He was transferred from outside ER to here at [facility] for higher level of care.      Interval Problem Update  11/18 Patient admitted earlier this am for partial small bowel obstruction.  He does not want NG tube placed and has no current nausea/vomiting.  Pain is minimal at time of examination.  He is on po depakote for years for seizure d/o and will be on IV keppra here until able to take po.  Bowel sounds are present but he denies passing flatus/bowel movement.  Will continue with supportive care.   Patient denies ever being diagnosed with COPD, diagnosis removed from chart.    I have discussed this patient's plan of care and discharge plan at IDT rounds today with Case Management, Nursing, Nursing leadership, and other members of the IDT team.    Consultants/Specialty  none    Code Status  Full Code    Disposition  Patient is not medically cleared for discharge.   Anticipate discharge to to home with close outpatient follow-up.  I have placed the  appropriate orders for post-discharge needs.    Review of Systems  Review of Systems   Constitutional:  Negative for chills and fever.   HENT:  Negative for congestion and sore throat.    Eyes:  Negative for blurred vision and photophobia.   Respiratory:  Negative for cough and shortness of breath.    Cardiovascular:  Negative for chest pain, claudication and leg swelling.   Gastrointestinal:  Positive for abdominal pain. Negative for constipation, diarrhea, heartburn, nausea and vomiting.   Genitourinary:  Negative for dysuria and hematuria.   Musculoskeletal:  Negative for joint pain and myalgias.   Skin:  Negative for itching and rash.   Neurological:  Negative for dizziness, sensory change, speech change, weakness and headaches.   Psychiatric/Behavioral:  Negative for depression. The patient is not nervous/anxious and does not have insomnia.       Physical Exam  Temp:  [36.6 °C (97.9 °F)] 36.6 °C (97.9 °F)  Pulse:  [89-94] 89  Resp:  [16] 16  BP: (113-120)/(72-80) 113/72  SpO2:  [96 %-100 %] 100 %    Physical Exam  Vitals and nursing note reviewed.   Constitutional:       General: He is not in acute distress.     Appearance: Normal appearance.   HENT:      Head: Normocephalic and atraumatic.   Eyes:      General: No scleral icterus.     Extraocular Movements: Extraocular movements intact.   Cardiovascular:      Rate and Rhythm: Normal rate and regular rhythm.      Pulses: Normal pulses.      Heart sounds: Normal heart sounds. No murmur heard.  Pulmonary:      Effort: Pulmonary effort is normal. No respiratory distress.      Breath sounds: Normal breath sounds. No wheezing, rhonchi or rales.   Abdominal:      General: Abdomen is flat. Bowel sounds are normal. There is no distension.      Palpations: Abdomen is soft.      Tenderness: There is abdominal tenderness (LLQ pain.). There is no rebound.   Musculoskeletal:         General: No swelling or tenderness.      Cervical back: Normal range of motion and neck  supple.   Lymphadenopathy:      Cervical: No cervical adenopathy.   Skin:     Coloration: Skin is not jaundiced.      Findings: No erythema.   Neurological:      General: No focal deficit present.      Mental Status: He is alert and oriented to person, place, and time. Mental status is at baseline.      Cranial Nerves: No cranial nerve deficit.   Psychiatric:         Mood and Affect: Mood normal.         Behavior: Behavior normal.       Fluids  No intake or output data in the 24 hours ending 11/18/22 1027    Laboratory  Recent Labs     11/18/22  0602   WBC 10.5   RBC 4.82   HEMOGLOBIN 16.1   HEMATOCRIT 46.4   MCV 96.3   MCH 33.4*   MCHC 34.7   RDW 46.3   PLATELETCT 229   MPV 9.6     Recent Labs     11/18/22  0602   SODIUM 138   POTASSIUM 4.7   CHLORIDE 103   CO2 24   GLUCOSE 118*   BUN 24*   CREATININE 0.94   CALCIUM 9.1                   Imaging  No orders to display        Assessment/Plan  * SBO (small bowel obstruction) (HCC)- (present on admission)  Assessment & Plan  - Patient does have a partial small bowel obstruction at the anastomosis from his previous hemicolectomy  -Patient currently is no longer vomiting, did refuse an NG at the outside facility  -Keep patient n.p.o.  -IV narcotics as needed    Leukocytosis- (present on admission)  Assessment & Plan  - Likely reactive, no additional signs or symptoms to suggest bacterial infection  -No antibiotics needed at this time  -Obtain CBC    Dyslipidemia- (present on admission)  Assessment & Plan  - Restart home statin when able    Anxiety- (present on admission)  Assessment & Plan  - Is on Klonopin twice a day, patient is unsure whether that is as needed or scheduled  -Start as needed Ativan  -Restart home Klonopin when able    Seizure disorder (HCC)- (present on admission)  Assessment & Plan  - Patient does take oral Depakote, national shortage of IV valproate, will start IV keppra then resume depakote when taking PO.       VTE prophylaxis: SCDs/TEDs    I have  performed a physical exam and reviewed and updated ROS and Plan today (11/18/2022). In review of yesterday's note (11/17/2022), there are no changes except as documented above.

## 2022-11-18 NOTE — ASSESSMENT & PLAN NOTE
- Is on Klonopin twice a day, patient is unsure whether that is as needed or scheduled  -Continue home klonopin

## 2022-11-18 NOTE — DISCHARGE PLANNING
Case Management Discharge Planning    Admission Date: 11/18/2022  GMLOS:    ALOS: 0    6-Clicks ADL Score:    6-Clicks Mobility Score:        Anticipated Discharge Dispo: Discharge Disposition: Discharged to home/self care (01)    DME Needed: No    Action(s) Taken: Updated Provider/Nurse on Discharge Plan    No anticipated DC needs. RN SHAYAN will continue to follow.     1330: RN CM spoke to patient and patients spouse Manuela, both have confirmed that Manuela will be able to provide transportation home.     Escalations Completed: None    Medically Clear: No    Next Steps: Medical clearance    Barriers to Discharge: Medical clearance

## 2022-11-18 NOTE — PROGRESS NOTES
Bedside report received from night RN. Assumed care of patient. Daily plan of care discussed. Pt resting comfortably in bed, wakes easily to voice. Pt denies further nausea or vomiting this AM. Reports intermittent, tolerable abdominal pain, declines pain medication at this time. Hourly rounding in place.

## 2022-11-18 NOTE — PROGRESS NOTES
[facility]OSPITALIST TRIAGE OFFICER DIRECT ADMISSION REPORT  Transferring facility: [facility]   Transferring physician: Dr. Albarran  Chief complaint: Partial SBO   Pertinent history & patient course: 77-year-old male with three days of abdominal, nausea, vomiting. Had history of appendectomy and right sided hemicolectomy 4 years ago.  CT abdomen showing partial small bowel obstruction.  White blood cell count 16.  Hemoglobin normal.  Chemistry panel and LFTs normal.  COVID-negative.  Patient refused NG tube however not actively vomiting in the ED.  Started on lactated Ringer 125 mL/h  Code Status: Full  Further work up or recommendations per triage officer prior to transfer: None  Consultants called prior to transfer and pertinent input from consultants: None  Patient accepted for transfer: Yes  Admission status: Inpatient.   Floor requested: Surgery       Please inform the triage officer upon arrival of the patient to [facility] for assignment of a hospitalist to perform admission.      For any question or concerns regarding the care of this patient, please reach out to the assigned hospita

## 2022-11-18 NOTE — H&P
Hospital Medicine History & Physical Note    Date of Service  11/18/2022    Primary Care Physician  No primary care provider on file.    Consultants  None    Specialist Names: None    Code Status  Full Code    Chief Complaint  No chief complaint on file.      History of Presenting Illness  Hernán Walters is a 77 y.o. male who presented 11/18/2022 with abdominal pain.  Patient states his pain initially started approximately a week ago however has been worsening.  He states its in his entire abdomen, sharp at times, can be severe.  He also states he began having nausea and vomiting and can no longer keep anything down.  Because of the ongoing nature of this and the worsening nature, he presented to the ER at an outlying facility.  There they did imaging and noted a small bowel obstruction.  Patient was transferred here for higher level of care.  Patient noted no bleeding.  He does have a history of appendectomy and hemicolectomy approximately 4 years ago.  Patient states he did have a tooth pulled and has been antibiotics on this for approximately 7 days.    I discussed the plan of care with patient.    Review of Systems  Review of Systems   Constitutional:  Negative for chills, fever and malaise/fatigue.   HENT:  Negative for congestion.    Respiratory:  Negative for cough, sputum production, shortness of breath and stridor.    Cardiovascular:  Negative for chest pain, palpitations and leg swelling.   Gastrointestinal:  Positive for abdominal pain, nausea and vomiting. Negative for diarrhea.   Genitourinary:  Negative for dysuria and urgency.   Musculoskeletal:  Negative for falls and myalgias.   Neurological:  Negative for dizziness, tingling, loss of consciousness, weakness and headaches.   Psychiatric/Behavioral:  Negative for depression and suicidal ideas.    All other systems reviewed and are negative.    Past Medical History   has no past medical history on file.    Surgical History   has no past surgical history  on file.     Family History  family history is not on file.   Family history reviewed with patient. There is no family history that is pertinent to the chief complaint.     Social History       Allergies  Not on File    Medications  Prior to Admission Medications   Prescriptions Last Dose Informant Patient Reported? Taking?   HYDROcodone-acetaminophen (NORCO) 5-325 MG Tab per tablet 11/17/2022  Yes Yes   Sig: Take 1 Tablet by mouth every 6 hours as needed.   amoxicillin (AMOXIL) 500 MG Cap 11/17/2022  Yes Yes   Sig: Take 1 Capsule by mouth 3 times a day. Indications: Periodontitis   aspirin EC (ECOTRIN) 81 MG Tablet Delayed Response 11/17/2022  Yes Yes   Sig: Take 1 Tablet by mouth every day.   atorvastatin (LIPITOR) 20 MG Tab 11/17/2022  Yes Yes   Sig: Take 1 Tablet by mouth every evening.   chlorhexidine (PERIDEX) 0.12 % Solution 11/17/2022  Yes Yes   Sig: Take 15 mL by mouth 2 times a day.   clonazePAM (KLONOPIN) 0.5 MG Tab 11/17/2022  Yes Yes   Sig: Take 1 Tablet by mouth 2 times a day.   divalproex (DEPAKOTE) 250 MG Tablet Delayed Response 11/17/2022  Yes Yes   Sig: Take 1 Tablet by mouth every day.      Facility-Administered Medications: None       Physical Exam  Temp:  [36.6 °C (97.9 °F)] 36.6 °C (97.9 °F)  Pulse:  [89-94] 89  Resp:  [16] 16  BP: (113-120)/(72-80) 113/72  SpO2:  [96 %-100 %] 100 %  Blood Pressure : 113/72   Temperature: 36.6 °C (97.9 °F)   Pulse: 89   Respiration: 16   Pulse Oximetry: 100 %       Physical Exam  Vitals and nursing note reviewed.   Constitutional:       General: He is not in acute distress.     Appearance: He is well-developed. He is not toxic-appearing or diaphoretic.   HENT:      Head: Normocephalic and atraumatic.      Right Ear: External ear normal.      Left Ear: External ear normal.      Nose: Nose normal. No congestion or rhinorrhea.      Mouth/Throat:      Mouth: Mucous membranes are dry.      Pharynx: No oropharyngeal exudate.   Eyes:      General:         Right eye:  No discharge.         Left eye: No discharge.   Neck:      Trachea: No tracheal deviation.   Cardiovascular:      Rate and Rhythm: Normal rate and regular rhythm.      Heart sounds: No murmur heard.    No friction rub. No gallop.   Pulmonary:      Effort: Pulmonary effort is normal. No respiratory distress.      Breath sounds: Normal breath sounds. No stridor. No wheezing or rales.   Chest:      Chest wall: No tenderness.   Abdominal:      General: Bowel sounds are decreased. There is no distension.      Palpations: Abdomen is soft.      Tenderness: There is generalized abdominal tenderness.   Musculoskeletal:         General: No tenderness. Normal range of motion.      Cervical back: Normal range of motion and neck supple. No edema or erythema.      Right lower leg: No edema.      Left lower leg: No edema.   Lymphadenopathy:      Cervical: No cervical adenopathy.   Skin:     General: Skin is warm and dry.      Findings: No erythema or rash.   Neurological:      Mental Status: He is alert and oriented to person, place, and time.      Cranial Nerves: No cranial nerve deficit.   Psychiatric:         Mood and Affect: Mood normal.         Behavior: Behavior normal.         Thought Content: Thought content normal.         Judgment: Judgment normal.       Laboratory:  Labs from the outside facility show white blood cell count 16.1 hemoglobin 16.2 hematocrit 48.3 platelets 255 sodium 137 potassium 4.3 chloride 99 BUN 26 creatinine 1.23 glucose 118      No results for input(s): ALTSGPT, ASTSGOT, ALKPHOSPHAT, TBILIRUBIN, DBILIRUBIN, GAMMAGT, AMYLASE, LIPASE, ALB, PREALBUMIN, GLUCOSE in the last 72 hours.      No results for input(s): NTPROBNP in the last 72 hours.      No results for input(s): TROPONINT in the last 72 hours.    Imaging:  No orders to display   CT scan from the outlying facility showed mildly dilated distal small bowel with transition point at the ileocolic anastomosis suggestive of partial small bowel  obstruction    no X-Ray or EKG requiring interpretation    Assessment/Plan:  Justification for Admission Status  I anticipate this patient will require at least two midnights for appropriate medical management, necessitating inpatient admission because small bowel obstruction    Patient will need a Med/Surg bed on SURGICAL service .  The need is secondary to small bowel obstruction.    * SBO (small bowel obstruction) ([facility])- (present on admission)  Assessment & Plan  - Patient does have a partial small bowel obstruction at the anastomosis from his previous hemicolectomy  -Patient currently is no longer vomiting, did refuse an NG at the outside facility  -Keep patient n.p.o.  -Patient does not have an acute abdomen, certainly does not need acute surgery, patient may however need surgical consultation at some point but it is not urgent  -IV narcotics as needed    Leukocytosis- (present on admission)  Assessment & Plan  - Likely reactive, no additional signs or symptoms to suggest bacterial infection  -No antibiotics needed at this time  -Obtain CBC    COPD (chronic obstructive pulmonary disease) ([facility])- (present on admission)  Assessment & Plan  - No acute exacerbation    Dyslipidemia- (present on admission)  Assessment & Plan  - Restart home statin when able    Anxiety- (present on admission)  Assessment & Plan  - Is on Klonopin twice a day, patient is unsure whether that is as needed or scheduled  -Start as needed Ativan  -Restart home Klonopin when able    Seizure disorder ([facility])- (present on admission)  Assessment & Plan  - Patient does take oral Depakote, give IV valproate, transition back to oral when able      VTE prophylaxis: SCDs/TEDs

## 2022-11-18 NOTE — RESPIRATORY CARE
"   COPD EDUCATION by COPD CLINICAL EDUCATOR  11/18/2022 at 8:41 AM by Germania Slater, RRT     Patient reviewed by COPD education team. Patient does not have a history or diagnosis of COPD. Patient is a smoker, smoking cessation education done. A comprehensive packet with tips to quit and information on outpatient classes given to patient.       Smoking Cessation Intervention and education completed, 20 minutes spent on smoking cessation education with patient.  Provided smoking cessation packet with \"Tips to Quit\" and brochure for \"Free Smoking Cessation Classes\".      COPD Screen  COPD Risk Screening  Do you have a history of COPD?: No    COPD Assessment  COPD Clinical Specialists ONLY  COPD Education Initiated: Yes--Short Intervention (Smoking Cessation Education Provided, pt did not know what COPD is and has never been told he has COPD, pt is trying to quit smoking, offered our free class online and encourage pt to get an OP PFT when feeling better here for SBO)  DME Company: NONE  Physician Name: None  Pulmonologist Name: None - Pt has NO hx dx COPD, NO PFT or xrays, CT to form an evidence base diagnostic diagnosis of COPD, No respiratory ihalers/medications  Referrals Initiated: Yes  Pulmonary Rehab: Declined  Smoking Cessation: Yes  Smoking Pack Years: over 20+yrs pt very hesitant to say  Hospice: Declined  Home Health Care: N/A  [facility] Outreach: N/A  [facility]: N/A  [facility]: Declined  Private In-Home Care Agency: N/A  Is this a COPD exacerbation patient?: No  (OP) Pulmonary Function Testing: Yes    PFT Results    No results found for: PFT    Meds to Beds        MY COPD ACTION PLAN     It is recommended that patients and physicians /healthcare providers complete this action plan together. This plan should be discussed at each physician visit and updated as needed.    The green, yellow and red zones show groups of symptoms of COPD. This list of symptoms is not comprehensive, " "and you may experience other symptoms. In the \"Actions\" column, your healthcare provider has recommended actions for you to take based on your symptoms.    Patient Name: Hernán Walters   YOB: 1945   Last Updated on:     Green Zone:  I am doing well today Actions     Usual activitiy and exercise level   Take daily medications     Usual amounts of cough and phlegm/mucus   Use oxygen as prescribed     Sleep well at night   Continue regular exercise/diet plan     Appetite is good   At all times avoid cigarette smoke, inhaled irritants     Daily Medications (these medications are taken every day):                Yellow Zone:  I am having a bad day or a COPD flare Actions     More breathless than usual   Continue daily medications     I have less energy for my daily activities   Use quick relief inhaler as ordered     Increased or thicker phlegm/mucus   Use oxygen as prescribed     Using quick relief inhaler/nebulizer more often   Get plenty of rest     Swelling of ankles more than usual   Use pursed lip breathing     More coughing than usual   At all times avoid cigarette smoke, inhaled irritants     I feel like I have a \"chest cold\"     Poor sleep and my symptoms woke me up     My appetite is not good     My medicine is not helping      Call provider immediately if symptoms don’t improve     Continue daily medications, add rescue medications:               Medications to be used during a flare up, (as Discussed with Provider):              Red Zone:  I need urgent medical care Actions     Severe shortness of breath even at rest   Call 911 or seek medical care immediately     Not able to do any activity because of breathing      Fever or shaking chills      Feeling confused or very drowsy       Chest pains      Coughing up blood                  "

## 2022-11-18 NOTE — ASSESSMENT & PLAN NOTE
- increased today after surgery and pt remains afebrile.  - OR report shows volvulus with perforation, required resection and anastomosis with Dr. Shultz.  - continue ceftriaxone and flagyl, transition to PO ABX prior to discharge. WBC remains elevated.  CXR reviewed, appears to be right lower lobe pneumonia likely aspiration pna

## 2022-11-18 NOTE — HOSPITAL COURSE
Hernán Walters is a 77 y.o. male who presented 11/18/2022 with abdominal pain.  Patient states his pain initially started approximately a week ago however has been worsening.  He states its in his entire abdomen, sharp at times, can be severe.  He also states he began having nausea and vomiting and can no longer keep anything down.  Because of the ongoing nature of this and the worsening nature, he presented to the ER at an outlying facility.  There they did imaging and noted a small bowel obstruction.  Patient was transferred here for higher level of care.  Patient noted no bleeding.  He does have a history of appendectomy and hemicolectomy approximately 4 years ago.  Patient states he did have a tooth pulled and has been antibiotics on this for approximately 7 days.  He was transferred from outside ER to here at [facility] for higher level of care.      S/p ex lap OR report shows volvulus with perforation, required resection and anastomosis with Dr. Shultz.    NGT removed on 11/26/22.

## 2022-11-18 NOTE — ASSESSMENT & PLAN NOTE
Transferred from [facility] for SBO    S/p ex lap with Dr. Shultz on 11/23  -OR report shows volvulus with perforation, required resection and anastomosis    NGT removed. Pt tolerating diet

## 2022-11-18 NOTE — PROGRESS NOTES
4 Eyes Skin Assessment Completed by IRINA Bell and IRINA Haro.    Head WDL  Ears WDL  Nose WDL  Mouth WDL  Neck WDL  Breast/Chest WDL  Shoulder Blades WDL  Spine Cyst   (R) Arm/Elbow/Hand WDL  (L) Arm/Elbow/Hand WDL  Abdomen Scar  Groin WDL  Scrotum/Coccyx/Buttocks WDL  (R) Leg WDL  (L) Leg WDL  (R) Heel/Foot/Toe WDL  (L) Heel/Foot/Toe WDL          Devices In Places Nasal Cannula      Interventions In Place NC W/Ear Foams    Possible Skin Injury No    Pictures Uploaded Into Epic N/A  Wound Consult Placed N/A  RN Wound Prevention Protocol Ordered No

## 2022-11-18 NOTE — PROGRESS NOTES
Received pt via REM, direct admission from [facility] ED. Pt transferred via gurney. Pt is not in distress. Pt able up to the bathroom with standby assist. Pt reported pain 4/10, declined medication. Didn't report any N/V. Pt is on 4lpm via NC. Safety and fall precaution in place, bed locked in lowest position, bed alarm on, call light within light. Continue to monitor.

## 2022-11-19 ENCOUNTER — APPOINTMENT (OUTPATIENT)
Dept: RADIOLOGY | Facility: MEDICAL CENTER | Age: 77
DRG: 329 | End: 2022-11-19
Attending: INTERNAL MEDICINE
Payer: MEDICARE

## 2022-11-19 LAB
ANION GAP SERPL CALC-SCNC: 10 MMOL/L (ref 7–16)
BUN SERPL-MCNC: 24 MG/DL (ref 8–22)
CALCIUM SERPL-MCNC: 8.4 MG/DL (ref 8.4–10.2)
CHLORIDE SERPL-SCNC: 107 MMOL/L (ref 96–112)
CO2 SERPL-SCNC: 21 MMOL/L (ref 20–33)
CREAT SERPL-MCNC: 0.87 MG/DL (ref 0.5–1.4)
ERYTHROCYTE [DISTWIDTH] IN BLOOD BY AUTOMATED COUNT: 48 FL (ref 35.9–50)
GFR SERPLBLD CREATININE-BSD FMLA CKD-EPI: 90 ML/MIN/1.73 M 2
GLUCOSE SERPL-MCNC: 99 MG/DL (ref 65–99)
HCT VFR BLD AUTO: 43.4 % (ref 42–52)
HGB BLD-MCNC: 14.2 G/DL (ref 14–18)
MCH RBC QN AUTO: 32.6 PG (ref 27–33)
MCHC RBC AUTO-ENTMCNC: 32.7 G/DL (ref 33.7–35.3)
MCV RBC AUTO: 99.8 FL (ref 81.4–97.8)
PLATELET # BLD AUTO: 189 K/UL (ref 164–446)
PMV BLD AUTO: 10.2 FL (ref 9–12.9)
POTASSIUM SERPL-SCNC: 4.4 MMOL/L (ref 3.6–5.5)
RBC # BLD AUTO: 4.35 M/UL (ref 4.7–6.1)
SODIUM SERPL-SCNC: 138 MMOL/L (ref 135–145)
WBC # BLD AUTO: 4.9 K/UL (ref 4.8–10.8)

## 2022-11-19 PROCEDURE — 700111 HCHG RX REV CODE 636 W/ 250 OVERRIDE (IP): Performed by: INTERNAL MEDICINE

## 2022-11-19 PROCEDURE — 99233 SBSQ HOSP IP/OBS HIGH 50: CPT | Performed by: INTERNAL MEDICINE

## 2022-11-19 PROCEDURE — 36415 COLL VENOUS BLD VENIPUNCTURE: CPT

## 2022-11-19 PROCEDURE — 80048 BASIC METABOLIC PNL TOTAL CA: CPT

## 2022-11-19 PROCEDURE — 770006 HCHG ROOM/CARE - MED/SURG/GYN SEMI*

## 2022-11-19 PROCEDURE — 700101 HCHG RX REV CODE 250: Performed by: INTERNAL MEDICINE

## 2022-11-19 PROCEDURE — 85027 COMPLETE CBC AUTOMATED: CPT

## 2022-11-19 PROCEDURE — 74018 RADEX ABDOMEN 1 VIEW: CPT

## 2022-11-19 PROCEDURE — 700105 HCHG RX REV CODE 258: Performed by: INTERNAL MEDICINE

## 2022-11-19 RX ORDER — LIDOCAINE HYDROCHLORIDE 20 MG/ML
JELLY TOPICAL 3 TIMES DAILY PRN
Status: DISCONTINUED | OUTPATIENT
Start: 2022-11-19 | End: 2022-11-28 | Stop reason: HOSPADM

## 2022-11-19 RX ORDER — ACETAMINOPHEN 325 MG/1
650 TABLET ORAL EVERY 6 HOURS PRN
Status: DISCONTINUED | OUTPATIENT
Start: 2022-11-19 | End: 2022-11-21

## 2022-11-19 RX ORDER — LIDOCAINE 50 MG/G
OINTMENT TOPICAL ONCE
Status: COMPLETED | OUTPATIENT
Start: 2022-11-19 | End: 2022-11-19

## 2022-11-19 RX ORDER — ONDANSETRON 4 MG/1
4 TABLET, ORALLY DISINTEGRATING ORAL EVERY 4 HOURS PRN
Status: DISCONTINUED | OUTPATIENT
Start: 2022-11-19 | End: 2022-11-26

## 2022-11-19 RX ADMIN — LORAZEPAM 0.5 MG: 2 INJECTION INTRAMUSCULAR; INTRAVENOUS at 20:42

## 2022-11-19 RX ADMIN — HYDROMORPHONE HYDROCHLORIDE 0.5 MG: 1 INJECTION, SOLUTION INTRAMUSCULAR; INTRAVENOUS; SUBCUTANEOUS at 13:09

## 2022-11-19 RX ADMIN — LORAZEPAM 0.5 MG: 2 INJECTION INTRAMUSCULAR; INTRAVENOUS at 14:39

## 2022-11-19 RX ADMIN — LEVETIRACETAM 500 MG: 100 INJECTION, SOLUTION INTRAVENOUS at 22:14

## 2022-11-19 RX ADMIN — HYDROMORPHONE HYDROCHLORIDE 0.5 MG: 1 INJECTION, SOLUTION INTRAMUSCULAR; INTRAVENOUS; SUBCUTANEOUS at 10:01

## 2022-11-19 RX ADMIN — LEVETIRACETAM 500 MG: 100 INJECTION, SOLUTION INTRAVENOUS at 09:19

## 2022-11-19 RX ADMIN — ONDANSETRON 4 MG: 2 INJECTION INTRAMUSCULAR; INTRAVENOUS at 20:09

## 2022-11-19 RX ADMIN — SODIUM CHLORIDE: 9 INJECTION, SOLUTION INTRAVENOUS at 01:23

## 2022-11-19 RX ADMIN — SODIUM CHLORIDE: 9 INJECTION, SOLUTION INTRAVENOUS at 15:18

## 2022-11-19 RX ADMIN — HYDROMORPHONE HYDROCHLORIDE 0.5 MG: 1 INJECTION, SOLUTION INTRAMUSCULAR; INTRAVENOUS; SUBCUTANEOUS at 19:57

## 2022-11-19 RX ADMIN — LIDOCAINE HYDROCHLORIDE 2 %: 20 JELLY TOPICAL at 21:13

## 2022-11-19 RX ADMIN — LIDOCAINE: 50 OINTMENT TOPICAL at 15:25

## 2022-11-19 ASSESSMENT — PATIENT HEALTH QUESTIONNAIRE - PHQ9
1. LITTLE INTEREST OR PLEASURE IN DOING THINGS: NOT AT ALL
2. FEELING DOWN, DEPRESSED, IRRITABLE, OR HOPELESS: NOT AT ALL
SUM OF ALL RESPONSES TO PHQ9 QUESTIONS 1 AND 2: 0

## 2022-11-19 ASSESSMENT — ENCOUNTER SYMPTOMS
FEVER: 0
CLAUDICATION: 0
HEADACHES: 0
PHOTOPHOBIA: 0
BLURRED VISION: 0
INSOMNIA: 0
SHORTNESS OF BREATH: 0
DIARRHEA: 0
NAUSEA: 0
VOMITING: 0
DEPRESSION: 0
CONSTIPATION: 0
MYALGIAS: 0
DIZZINESS: 0
NERVOUS/ANXIOUS: 0
SPEECH CHANGE: 0
ABDOMINAL PAIN: 1
WEAKNESS: 0
COUGH: 0
SENSORY CHANGE: 0
SORE THROAT: 0
CHILLS: 0
HEARTBURN: 0

## 2022-11-19 ASSESSMENT — PAIN DESCRIPTION - PAIN TYPE
TYPE: ACUTE PAIN

## 2022-11-19 NOTE — H&P
"Surgical History and Physical    Date: 11/19/2022    Requesting Physician: Hospitalist service   PCP: No primary care provider on file.  Attending Physician: America Osborne M.D. [facility]    CC: \"I don't feel worth a damn\"    HPI: This is a 77 y.o. male who is presenting with complaints of abdomeinal pain, nause and vomiting . The pain is all over his abdomen, described as sharp and bloating, severity is 7/10. It has been going on for a few days. It is worsened with nothing  and alleviated by nothing. Along with this he has had nausea and vomiting.     CT from OSH shows a pSBO with question of anastomotic narrowing at his prior right hemicolectomy.     PMH:  HTN  HLD    Surgical Hx:  Right hemicolectomy x4y ago for benign disease    Home meds:  Asa  Clomazepam  Valropic acid  Norco  Lipitor    Current Facility-Administered Medications   Medication Dose Route Frequency Provider Last Rate Last Admin    HYDROmorphone (Dilaudid) injection 0.5 mg  0.5 mg Intravenous Q3HRS PRN Rashi Vegas D.O.   0.5 mg at 11/19/22 1001    NS infusion   Intravenous Continuous VICENTE Hillman.O. 100 mL/hr at 11/19/22 0123 New Bag at 11/19/22 0123    labetalol (NORMODYNE/TRANDATE) injection 10 mg  10 mg Intravenous Q4HRS PRN Rashi Vegas D.O.        ondansetron (ZOFRAN) syringe/vial injection 4 mg  4 mg Intravenous Q4HRS PRN VICENTE Hillman.O.        ondansetron (ZOFRAN ODT) dispertab 4 mg  4 mg Oral Q4HRS PRN VICENTE Hillman.O.        LORazepam (ATIVAN) injection 0.5 mg  0.5 mg Intravenous Q4HRS PRN VICENTE Hillman.O.        levETIRAcetam (Keppra) injection 500 mg  500 mg Intravenous Q12HRS VICENTE Alarcon.O.   500 mg at 11/19/22 0919    acetaminophen (Tylenol) tablet 650 mg  650 mg Oral Q6HRS PRN Ritesh Martin M.D.           Social History     Socioeconomic History    Marital status:      Spouse name: Not on file    Number of children: Not on file    Years of education: Not on file    Highest " education level: Not on file   Occupational History    Not on file   Tobacco Use    Smoking status: Some Days     Types: Cigarettes    Smokeless tobacco: Current   Substance and Sexual Activity    Alcohol use: Not on file    Drug use: Not on file    Sexual activity: Not on file   Other Topics Concern    Not on file   Social History Narrative    Not on file     Social Determinants of Health     Financial Resource Strain: Not on file   Food Insecurity: Not on file   Transportation Needs: Not on file   Physical Activity: Not on file   Stress: Not on file   Social Connections: Not on file   Intimate Partner Violence: Not on file   Housing Stability: Not on file       No family history on file.    Allergies:  Patient has no known allergies.    Review of Systems:  Constitutional: Negative for fever, chills, weight loss, malaise/fatigue and diaphoresis.   HENT: Negative for hearing loss, ear pain, nosebleeds, congestion, sore throat, neck pain, tinnitus and ear discharge.    Eyes: Negative for blurred vision, double vision, photophobia, pain, discharge and redness.   Respiratory: Negative for cough, hemoptysis, sputum production, shortness of breath, wheezing and stridor.    Cardiovascular: Negative for chest pain, palpitations, orthopnea, claudication, leg swelling and PND.   Gastrointestinal: Negative for heartburn, nausea, vomiting, abdominal pain, diarrhea, constipation, blood in stool and melena.   Genitourinary: Negative for dysuria, urgency, frequency, hematuria and flank pain.   Musculoskeletal: Negative for myalgias, back pain, joint pain and falls.   Skin: Negative for itching and rash.  Neurological: Negative for dizziness, tingling, tremors, sensory change, speech change, focal weakness, seizures, loss of consciousness, weakness and headaches.   Endo/Heme/Allergies: Negative for environmental allergies and polydipsia. Does not bruise/bleed easily.   Psychiatric/Behavioral: Negative for depression, suicidal  ideas, hallucinations, memory loss and substance abuse. The patient is not nervous/anxious and does not have insomnia.    Physical Exam:  /76   Pulse 70   Temp 37.4 °C (99.3 °F) (Oral)   Resp 20   Wt 60.7 kg (133 lb 13.1 oz)   SpO2 93%     Constitutional: he is oriented to person, place, and time.  he appears well-developed and well-nourished. No distress.   Head: Normocephalic and atraumatic.   Neck: Normal range of motion. Neck supple. No JVD present. No tracheal deviation present. No thyromegaly present.   Cardiovascular: Normal rate, regular rhythm, normal heart sounds and intact distal pulses.  Exam reveals no gallop and no friction rub.  No murmur heard.  Pulmonary/Chest: Effort normal and breath sounds normal. No stridor. No respiratory distress. he has no wheezes. he has no rales.   Abdominal: Distended, soft. There is no tenderness. There is no rebound and no guarding.   Musculoskeletal: Normal range of motion. he exhibits no edema and no tenderness.   Neurological: he is alert and oriented to person, place, and time. he has normal reflexes. No cranial nerve deficit. Coordination normal.   Skin: Skin is warm and dry. No rash noted. he is not diaphoretic. No erythema. No pallor.   Psychiatric: he has a normal mood and affect.  Behavior is normal.       Labs:  Recent Labs     11/18/22  0602 11/19/22  0138   WBC 10.5 4.9   RBC 4.82 4.35*   HEMOGLOBIN 16.1 14.2   HEMATOCRIT 46.4 43.4   MCV 96.3 99.8*   MCH 33.4* 32.6   MCHC 34.7 32.7*   RDW 46.3 48.0   PLATELETCT 229 189   MPV 9.6 10.2     Recent Labs     11/18/22  0602 11/19/22  0138   SODIUM 138 138   POTASSIUM 4.7 4.4   CHLORIDE 103 107   CO2 24 21   GLUCOSE 118* 99   BUN 24* 24*   CREATININE 0.94 0.87   CALCIUM 9.1 8.4         Recent Labs     11/18/22  0602   ASTSGOT 13   ALTSGPT 11   TBILIRUBIN 0.6   ALKPHOSPHAT 52   GLOBULIN 2.4       Radiology:  Reviewed report from OSH: mildly dilater distal SB with transition point at the ileocolic  anastomosis, suggestive of partial SBO. Small volume ascited without e/o abscess or perforation.     The radiologist's interpretation of all images has been reviewed by me.     Assessment: This is a 77 y.o. male with a psbo, query anastomotic stricture.    Plan: NG tube decompression, serial abdominal exams, will follow along for improvement, if clinically deteriorates will need operative evaluation. He understands.    Thank you very much for this consultation.    America Osborne M.D.  [facility]  [phone]

## 2022-11-19 NOTE — PROGRESS NOTES
[facility] Medicine Daily Progress Note    Date of Service  11/19/2022    Chief Complaint  Hernán Walters is a 77 y.o. male admitted 11/18/2022 with abdominal pain, nausea and vomiting.     Hospital Course  Hernán Walters is a 77 y.o. male who presented 11/18/2022 with abdominal pain.  Patient states his pain initially started approximately a week ago however has been worsening.  He states its in his entire abdomen, sharp at times, can be severe.  He also states he began having nausea and vomiting and can no longer keep anything down.  Because of the ongoing nature of this and the worsening nature, he presented to the ER at an outlying facility.  There they did imaging and noted a small bowel obstruction.  Patient was transferred here for higher level of care.  Patient noted no bleeding.  He does have a history of appendectomy and hemicolectomy approximately 4 years ago.  Patient states he did have a tooth pulled and has been antibiotics on this for approximately 7 days.  He was transferred from outside ER to here at [facility] for higher level of care.      Interval Problem Update  11/18 Patient admitted earlier this am for partial small bowel obstruction.  He does not want NG tube placed and has no current nausea/vomiting.  Pain is minimal at time of examination.  He is on po depakote for years for seizure d/o and will be on IV keppra here until able to take po.  Bowel sounds are present but he denies passing flatus/bowel movement.  Will continue with supportive care.   Patient denies ever being diagnosed with COPD, diagnosis removed from chart.  11/19 Patient states he still feels poorly this am when seen, he denies passing flatus.  KUB confirms continued bowel obstruction so I ordered small bowel follow through.  General surgery to consult today, discussed with Dr Osborne, patient now agrees to NG tube placement for decompression.      I have discussed this patient's plan of care and discharge plan at IDT rounds today with  Case Management, Nursing, Nursing leadership, and other members of the IDT team.    Consultants/Specialty  none    Code Status  Full Code    Disposition  Patient is not medically cleared for discharge.   Anticipate discharge to to home with close outpatient follow-up.  I have placed the appropriate orders for post-discharge needs.    Review of Systems  Review of Systems   Constitutional:  Negative for chills and fever.   HENT:  Negative for congestion and sore throat.    Eyes:  Negative for blurred vision and photophobia.   Respiratory:  Negative for cough and shortness of breath.    Cardiovascular:  Negative for chest pain, claudication and leg swelling.   Gastrointestinal:  Positive for abdominal pain (LLQ focal today.). Negative for constipation, diarrhea, heartburn, nausea and vomiting.   Genitourinary:  Negative for dysuria and hematuria.   Musculoskeletal:  Negative for joint pain and myalgias.   Skin:  Negative for itching and rash.   Neurological:  Negative for dizziness, sensory change, speech change, weakness and headaches.   Psychiatric/Behavioral:  Negative for depression. The patient is not nervous/anxious and does not have insomnia.       Physical Exam  Temp:  [36.7 °C (98 °F)-37.7 °C (99.8 °F)] 37.4 °C (99.3 °F)  Pulse:  [70-82] 70  Resp:  [18-20] 20  BP: (119-137)/(76-78) 137/76  SpO2:  [93 %-99 %] 93 %    Physical Exam  Vitals and nursing note reviewed.   Constitutional:       General: He is not in acute distress.     Appearance: Normal appearance.   HENT:      Head: Normocephalic and atraumatic.   Eyes:      General: No scleral icterus.     Extraocular Movements: Extraocular movements intact.   Cardiovascular:      Rate and Rhythm: Normal rate and regular rhythm.      Pulses: Normal pulses.      Heart sounds: Normal heart sounds. No murmur heard.  Pulmonary:      Effort: Pulmonary effort is normal. No respiratory distress.      Breath sounds: Normal breath sounds. No wheezing, rhonchi or rales.    Abdominal:      General: Abdomen is flat. Bowel sounds are normal. There is no distension.      Palpations: Abdomen is soft.      Tenderness: There is abdominal tenderness (LLQ pain.). There is no rebound.   Musculoskeletal:         General: No swelling or tenderness.      Cervical back: Normal range of motion and neck supple.   Lymphadenopathy:      Cervical: No cervical adenopathy.   Skin:     Coloration: Skin is not jaundiced.      Findings: No erythema.   Neurological:      General: No focal deficit present.      Mental Status: He is alert and oriented to person, place, and time. Mental status is at baseline.      Cranial Nerves: No cranial nerve deficit.   Psychiatric:         Mood and Affect: Mood normal.         Behavior: Behavior normal.       Fluids    Intake/Output Summary (Last 24 hours) at 11/19/2022 1228  Last data filed at 11/19/2022 0015  Gross per 24 hour   Intake --   Output 650 ml   Net -650 ml       Laboratory  Recent Labs     11/18/22  0602 11/19/22  0138   WBC 10.5 4.9   RBC 4.82 4.35*   HEMOGLOBIN 16.1 14.2   HEMATOCRIT 46.4 43.4   MCV 96.3 99.8*   MCH 33.4* 32.6   MCHC 34.7 32.7*   RDW 46.3 48.0   PLATELETCT 229 189   MPV 9.6 10.2       Recent Labs     11/18/22  0602 11/19/22  0138   SODIUM 138 138   POTASSIUM 4.7 4.4   CHLORIDE 103 107   CO2 24 21   GLUCOSE 118* 99   BUN 24* 24*   CREATININE 0.94 0.87   CALCIUM 9.1 8.4                     Imaging  [id number] VIEW   Final Result      1.  Dilated loops of small bowel consistent with small bowel obstruction.      DX-SMALL BOWEL SERIES    (Results Pending)          Assessment/Plan  * SBO (small bowel obstruction) ([facility])- (present on admission)  Assessment & Plan  - Patient does have a partial small bowel obstruction at the anastomosis from his previous hemicolectomy  -Patient currently is no longer vomiting, did refuse an NG at the outside facility  -Patient reporting no flatus and that his pain feels like a bubble that needs to pop, LLQ  tenderness.  -Keep patient n.p.o.  -IV narcotics as needed  -General surgery consulting - discussed with Dr Osborne  - SBFT ordered.  - NG tube to be placed.      Leukocytosis- (present on admission)  Assessment & Plan  - Likely reactive, no additional signs or symptoms to suggest bacterial infection  -No antibiotics needed at this time  -Obtain CBC    Dyslipidemia- (present on admission)  Assessment & Plan  - Restart home statin when able    Anxiety- (present on admission)  Assessment & Plan  - Is on Klonopin twice a day, patient is unsure whether that is as needed or scheduled  -Start as needed Ativan  -Restart home Klonopin when able    Seizure disorder (HCC)- (present on admission)  Assessment & Plan  - Patient does take oral Depakote, national shortage of IV valproate, will start IV keppra then resume depakote when taking PO.         VTE prophylaxis: SCDs/TEDs    I have performed a physical exam and reviewed and updated ROS and Plan today (11/19/2022). In review of yesterday's note (11/18/2022), there are no changes except as documented above.

## 2022-11-19 NOTE — PROGRESS NOTES
Received report from day shift RN. Assumed care of pt. Pt A&O X 4, on RA. Pt reports intermittent mid abd pain, but denies any intervention at this time. Pt denies N/V, but is passing gas. Pt updated with strict NPO status, continued on IV fluids per protocol. Fall precautions in place, call light within reach. All needs met at this time.

## 2022-11-19 NOTE — CARE PLAN
The patient is Stable - Low risk of patient condition declining or worsening    Shift Goals  Clinical Goals: Monitor VS, pain, nausea and vomitting  Patient Goals: comfort and rest  Family Goals: n/a    Progress made toward(s) clinical / shift goals: Pt denies intolerable pain, nausea or vomiting. Able to reposition self for comfort.     Problem: Knowledge Deficit - Standard  Goal: Patient and family/care givers will demonstrate understanding of plan of care, disease process/condition, diagnostic tests and medications  Outcome: Progressing     Problem: Skin Integrity  Goal: Skin integrity is maintained or improved  Outcome: Progressing       Patient is not progressing towards the following goals:n/a

## 2022-11-19 NOTE — CARE PLAN
The patient is Stable - Low risk of patient condition declining or worsening    Shift Goals  Clinical Goals: Keep pt on strict NPO; Pt to have BM during the shift  Patient Goals: Pt will sleep and rest comfortably tonight  Family Goals: Pt will be assisted with ambulation as per spouse pt has gen weakness    Progress made toward(s) clinical / shift goals:  Pt on strict NPO overnight; provided with oral care, mouth swabbed during the shift. Pt unable to have BM but is passing flatus. Pt able to stand at edge of bed when using urinal. Pt educated to call for assistance if needing to go to the BR[facility]. Hourly rounding in progress, progressing in other goals.    Patient is not progressing towards the following goals:      Problem: Bowel Elimination  Goal: Establish and maintain regular bowel function  Outcome: Not Progressing

## 2022-11-20 ENCOUNTER — APPOINTMENT (OUTPATIENT)
Dept: RADIOLOGY | Facility: MEDICAL CENTER | Age: 77
DRG: 329 | End: 2022-11-20
Attending: INTERNAL MEDICINE
Payer: MEDICARE

## 2022-11-20 LAB
ALBUMIN SERPL BCP-MCNC: 3.2 G/DL (ref 3.2–4.9)
ALBUMIN/GLOB SERPL: 1.3 G/DL
ALP SERPL-CCNC: 44 U/L (ref 30–99)
ALT SERPL-CCNC: 11 U/L (ref 2–50)
ANION GAP SERPL CALC-SCNC: 11 MMOL/L (ref 7–16)
AST SERPL-CCNC: 12 U/L (ref 12–45)
BILIRUB SERPL-MCNC: 0.5 MG/DL (ref 0.1–1.5)
BUN SERPL-MCNC: 34 MG/DL (ref 8–22)
CALCIUM SERPL-MCNC: 8.7 MG/DL (ref 8.4–10.2)
CHLORIDE SERPL-SCNC: 108 MMOL/L (ref 96–112)
CO2 SERPL-SCNC: 25 MMOL/L (ref 20–33)
CREAT SERPL-MCNC: 0.96 MG/DL (ref 0.5–1.4)
ERYTHROCYTE [DISTWIDTH] IN BLOOD BY AUTOMATED COUNT: 46.1 FL (ref 35.9–50)
GFR SERPLBLD CREATININE-BSD FMLA CKD-EPI: 82 ML/MIN/1.73 M 2
GLOBULIN SER CALC-MCNC: 2.4 G/DL (ref 1.9–3.5)
GLUCOSE SERPL-MCNC: 124 MG/DL (ref 65–99)
HCT VFR BLD AUTO: 42.8 % (ref 42–52)
HGB BLD-MCNC: 14.7 G/DL (ref 14–18)
MCH RBC QN AUTO: 33.5 PG (ref 27–33)
MCHC RBC AUTO-ENTMCNC: 34.3 G/DL (ref 33.7–35.3)
MCV RBC AUTO: 97.5 FL (ref 81.4–97.8)
PLATELET # BLD AUTO: 192 K/UL (ref 164–446)
PMV BLD AUTO: 10.4 FL (ref 9–12.9)
POTASSIUM SERPL-SCNC: 3.9 MMOL/L (ref 3.6–5.5)
PROT SERPL-MCNC: 5.6 G/DL (ref 6–8.2)
RBC # BLD AUTO: 4.39 M/UL (ref 4.7–6.1)
SODIUM SERPL-SCNC: 144 MMOL/L (ref 135–145)
WBC # BLD AUTO: 4.1 K/UL (ref 4.8–10.8)

## 2022-11-20 PROCEDURE — 51798 US URINE CAPACITY MEASURE: CPT

## 2022-11-20 PROCEDURE — 97162 PT EVAL MOD COMPLEX 30 MIN: CPT

## 2022-11-20 PROCEDURE — 99232 SBSQ HOSP IP/OBS MODERATE 35: CPT | Performed by: INTERNAL MEDICINE

## 2022-11-20 PROCEDURE — 700111 HCHG RX REV CODE 636 W/ 250 OVERRIDE (IP): Performed by: INTERNAL MEDICINE

## 2022-11-20 PROCEDURE — 36415 COLL VENOUS BLD VENIPUNCTURE: CPT

## 2022-11-20 PROCEDURE — 85027 COMPLETE CBC AUTOMATED: CPT

## 2022-11-20 PROCEDURE — 770006 HCHG ROOM/CARE - MED/SURG/GYN SEMI*

## 2022-11-20 PROCEDURE — 74250 X-RAY XM SM INT 1CNTRST STD: CPT

## 2022-11-20 PROCEDURE — 700105 HCHG RX REV CODE 258: Performed by: INTERNAL MEDICINE

## 2022-11-20 PROCEDURE — 94760 N-INVAS EAR/PLS OXIMETRY 1: CPT

## 2022-11-20 PROCEDURE — 80053 COMPREHEN METABOLIC PANEL: CPT

## 2022-11-20 PROCEDURE — 97535 SELF CARE MNGMENT TRAINING: CPT

## 2022-11-20 RX ADMIN — AMPICILLIN AND SULBACTAM 3 G: 1; 2 INJECTION, POWDER, FOR SOLUTION INTRAMUSCULAR; INTRAVENOUS at 18:40

## 2022-11-20 RX ADMIN — LEVETIRACETAM 500 MG: 100 INJECTION, SOLUTION INTRAVENOUS at 21:35

## 2022-11-20 RX ADMIN — SODIUM CHLORIDE: 9 INJECTION, SOLUTION INTRAVENOUS at 01:21

## 2022-11-20 RX ADMIN — AMPICILLIN AND SULBACTAM 3 G: 1; 2 INJECTION, POWDER, FOR SOLUTION INTRAMUSCULAR; INTRAVENOUS at 15:38

## 2022-11-20 RX ADMIN — ONDANSETRON 4 MG: 2 INJECTION INTRAMUSCULAR; INTRAVENOUS at 02:40

## 2022-11-20 RX ADMIN — HYDROMORPHONE HYDROCHLORIDE 0.5 MG: 1 INJECTION, SOLUTION INTRAMUSCULAR; INTRAVENOUS; SUBCUTANEOUS at 21:34

## 2022-11-20 RX ADMIN — LEVETIRACETAM 500 MG: 100 INJECTION, SOLUTION INTRAVENOUS at 09:45

## 2022-11-20 ASSESSMENT — ENCOUNTER SYMPTOMS
NERVOUS/ANXIOUS: 0
DIZZINESS: 0
WEAKNESS: 0
SPEECH CHANGE: 0
PHOTOPHOBIA: 0
CONSTIPATION: 0
VOMITING: 0
INSOMNIA: 0
HEADACHES: 0
SHORTNESS OF BREATH: 0
SORE THROAT: 0
DIARRHEA: 0
MYALGIAS: 0
CLAUDICATION: 0
FEVER: 0
COUGH: 0
BLURRED VISION: 0
HEARTBURN: 0
SENSORY CHANGE: 0
CHILLS: 0
NAUSEA: 0
ABDOMINAL PAIN: 1
DEPRESSION: 0

## 2022-11-20 ASSESSMENT — PAIN DESCRIPTION - PAIN TYPE
TYPE: ACUTE PAIN
TYPE: ACUTE PAIN

## 2022-11-20 ASSESSMENT — COGNITIVE AND FUNCTIONAL STATUS - GENERAL
SUGGESTED CMS G CODE MODIFIER MOBILITY: CK
STANDING UP FROM CHAIR USING ARMS: A LITTLE
MOVING FROM LYING ON BACK TO SITTING ON SIDE OF FLAT BED: A LITTLE
MOVING TO AND FROM BED TO CHAIR: A LITTLE
CLIMB 3 TO 5 STEPS WITH RAILING: A LITTLE
WALKING IN HOSPITAL ROOM: A LITTLE
MOBILITY SCORE: 19

## 2022-11-20 ASSESSMENT — GAIT ASSESSMENTS
ASSISTIVE DEVICE: OTHER (COMMENTS)
GAIT LEVEL OF ASSIST: MINIMAL ASSIST
DEVIATION: DECREASED HEEL STRIKE;DECREASED TOE OFF
DISTANCE (FEET): 120

## 2022-11-20 ASSESSMENT — PATIENT HEALTH QUESTIONNAIRE - PHQ9
1. LITTLE INTEREST OR PLEASURE IN DOING THINGS: NOT AT ALL
SUM OF ALL RESPONSES TO PHQ9 QUESTIONS 1 AND 2: 0
2. FEELING DOWN, DEPRESSED, IRRITABLE, OR HOPELESS: NOT AT ALL

## 2022-11-20 NOTE — THERAPY
Physical Therapy   Initial Evaluation     Patient Name: Hernán Walters  Age:  77 y.o., Sex:  male  Medical Record #: [id number]  Today's Date: 11/20/2022     Precautions  Precautions: (P) Fall Risk    Assessment  Patient is 77 y.o. male admit 11/18 with abd pn, N & V. Hx of appendectomy hemicolectomy 4 yrs ado. Pt on depakote for yrs for seizures. Hx of head injury per nsg. Pt does show some impulsive tendencies affecting safety. Pt is upset with the need to have NG tube.   NG tube pulled out by pt and is getting reinserted today. Amb limited by c/o of dizziness and pt required chair to sit. Recommend home with home health. And FWW. No sure is pt would be compliant with FWW. He does have a SPC. Will follow pt during acute stay.       Plan    Recommend Physical Therapy 3 times per week until therapy goals are met for the following treatments:  Gait Training, Self Care/Home Evaluation, Stair Training, Therapeutic Activities, and Therapeutic Exercises    DC Equipment Recommendations: (P) Front-Wheel Walker  Discharge Recommendations: (P) Recommend home health for continued physical therapy services            11/20/22 0848   Charge Group   PT Evaluation PT Evaluation Mod   PT Self Care / Home Evaluation  1   Total Time Spent   PT Total Time Yes   PT Evaluation Time Spent (Mins) 22   PT Self Care/Home Evaluation Time Spent (Mins) 8   Initial Contact Note    Initial Contact Note Order Received and Verified, Physical Therapy Evaluation in Progress with Full Report to Follow.   Precautions   Precautions Fall Risk   Vitals   O2 Delivery Device None - Room Air   Pain 0 - 10 Group   Therapist Pain Assessment Nurse Notified;Post Activity Pain Same as Prior to Activity;0   Prior Living Situation   Prior Services None   Housing / Facility 2 Story House  (pt stays on main level with full bath)   Steps Into Home 1   Steps In Home 8   Rail Both Rail (Steps into Home)   Equipment Owned Single Point Cane   Lives with - Patient's Self Care  Capacity Spouse   [facility]   Prior Level of Functional Mobility   Bed Mobility Independent   Transfer Status Independent   Ambulation Independent   Distance Ambulation (Feet)   (200)   Assistive Devices Used None   Comments indep with aDL's, drives. walks outside   History of Falls   History of Falls No   Cognition    Cognition / Consciousness X   Level of Consciousness Alert   Safety Awareness Impulsive   Comments pt got anxius and impulsive looking for chair to sit due to fatique and dizznesss   Passive ROM Lower Body   Passive ROM Lower Body WDL   Active ROM Lower Body    Active ROM Lower Body  WDL   Strength Lower Body   Lower Body Strength  X   Gross Strength Generalized Weakness, Equal Bilaterally   Comments grossly observed 4/5 throughout   Sensation Lower Body   Lower Extremity Sensation   WDL   Lower Body Muscle Tone   Lower Body Muscle Tone  WDL   Neurological Concerns   Comments hx of head injury per nsg   Coordination Lower Body    Coordination Lower Body  WDL   Vision   Vision Comments denies   Balance Assessment   Sitting Balance (Static) Good   Sitting Balance (Dynamic) Fair +   Standing Balance (Static) Fair   Standing Balance (Dynamic) Fair -   Weight Shift Sitting Good   Weight Shift Standing Fair   Comments no overt LOB   Gait Analysis   Gait Level Of Assist Minimal Assist  (IV pole and HHA)   Assistive Device Other (Comments)  (IV pole)   Distance (Feet) 120   # of Times Distance was Traveled 2   Deviation Decreased Heel Strike;Decreased Toe Off   # of Stairs Climbed 0   Weight Bearing Status full   Comments VC's to slow pace. Pt required cahir to sit due to dizziness   Bed Mobility    Supine to Sit Modified Independent   Sit to Supine Modified Independent   Scooting Supervised   Rolling Supervised   Comments use of rail   Functional Mobility   Sit to Stand Contact Guard Assist   Bed, Chair, Wheelchair Transfer Contact Guard Assist   How much difficulty does the  patient currently have...   Turning over in bed (including adjusting bedclothes, sheets and blankets)? 4   Sitting down on and standing up from a chair with arms (e.g., wheelchair, bedside commode, etc.) 3   Moving from lying on back to sitting on the side of the bed? 3   How much help from another person does the patient currently need...   Moving to and from a bed to a chair (including a wheelchair)? 3   Need to walk in a hospital room? 3   Climbing 3-5 steps with a railing? 3   6 clicks Mobility Score 19   Activity Tolerance   Sitting Edge of Bed 12 m ins   Standing 6 min x 2   Edema / Skin Assessment   Edema / Skin  Not Assessed   Short Term Goals    Short Term Goal # 1 in 6 v pt will perform transfer to various surfaces with mod indep   Short Term Goal # 2 in 6 v pt will amb using SPC or FWW with supervison x 200 feet including turns and obstacles   Short Term Goal # 3 in 6 V pt will climb up / down 1 step with supervison using 1 rail.   Education Group   Education Provided Role of Physical Therapist;Exercises - Seated   Role of Physical Therapist Patient Response Patient;Acceptance;Explanation;Verbal Demonstration   Exercises - Seated Patient Response Patient;Acceptance;Explanation;Reinforcement Needed   Additional Comments maraching, ankle pumps and LAQ EOB 2  x 10 rep   Problem List    Problems Decreased Activity Tolerance;Impaired Ambulation;Impaired Transfers;Functional Strength Deficit;Impaired Balance   Anticipated Discharge Equipment and Recommendations   DC Equipment Recommendations Front-Wheel Walker   Discharge Recommendations Recommend home health for continued physical therapy services   Interdisciplinary Plan of Care Collaboration   IDT Collaboration with  Nursing;Physician   Patient Position at End of Therapy Seated;Phone within Reach;Tray Table within Reach;Call Light within Reach;Bed Alarm On   Collaboration Comments re: [email or web address]   Session Information   Date / Session Number  11/20/22-1 ( 1/3,  11/26)

## 2022-11-20 NOTE — PROGRESS NOTES
Pt educated on importance of NG-tube and verbalizes agreement to reinsertion. Pt medicated for pain and anxiety prior to placement attempt and lidocaine also applied intranasally (see MAR). 14 Samoan NG tube successfully inserted into the L nare after 2 attempts and was then secured with tape. Pt tolerated fairly. Approximately 100 cc of brown/green gastric contents immediately out. Abdominal X-ray ordered to confirm placement prior to connecting to suction.

## 2022-11-20 NOTE — CARE PLAN
The patient is Stable - Low risk of patient condition declining or worsening    Shift Goals  Clinical Goals: Insert NGT per MD order for bowel decompression; pt will have decrease freq of N/V  Patient Goals: Pt will sleep and rest comfortably tonight  Family Goals: NA    Progress made toward(s) clinical / shift goals:   NGT successfully inserted and placement verified by X-ray; however, pt pulled NGT 1 hr after placement and refuses re-insertion. 450 ml of bilious output collected. Pt also had x3 episode of N/V with  bilious output, 530 total during the shift, medicated with IV Zofran.    Patient is not progressing towards the following goals:      Problem: Knowledge Deficit - Standard  Goal: Patient and family/care givers will demonstrate understanding of plan of care, disease process/condition, diagnostic tests and medications  Outcome: Not Progressing     Problem: Bowel Elimination  Goal: Establish and maintain regular bowel function  Outcome: Not Progressing

## 2022-11-20 NOTE — PROGRESS NOTES
Immediately after x-ray we were informed by radiology tech that the patient started to pull his NG tube out again. Upon returning to room tube was found approximately 6 inches from previous paulette of NG tube site. Tube readvanced to same position and retaped. Pt reeducated on importance of leaving the NG tube in.

## 2022-11-20 NOTE — PROGRESS NOTES
"Received report from day shift RN. Assumed care of pt. Pt A&O X 4, on RA. Pt reports intermittent abd pain of 5/10, repositioned for comfort. Pt educated about the plan for NGT insertion tonight per MD orders, pt stated \"I don't care, I'm willing to try again.\" Pt updated with POC, fall precautions in place, call light within reach. All needs met at this time.   "

## 2022-11-20 NOTE — PROGRESS NOTES
[facility] Medicine Daily Progress Note    Date of Service  11/20/2022    Chief Complaint  Hernán Walters is a 77 y.o. male admitted 11/18/2022 with abdominal pain, nausea and vomiting.     Hospital Course  Hernán Walters is a 77 y.o. male who presented 11/18/2022 with abdominal pain.  Patient states his pain initially started approximately a week ago however has been worsening.  He states its in his entire abdomen, sharp at times, can be severe.  He also states he began having nausea and vomiting and can no longer keep anything down.  Because of the ongoing nature of this and the worsening nature, he presented to the ER at an outlying facility.  There they did imaging and noted a small bowel obstruction.  Patient was transferred here for higher level of care.  Patient noted no bleeding.  He does have a history of appendectomy and hemicolectomy approximately 4 years ago.  Patient states he did have a tooth pulled and has been antibiotics on this for approximately 7 days.  He was transferred from outside ER to here at [facility] for higher level of care.      Interval Problem Update  11/18 Patient admitted earlier this am for partial small bowel obstruction.  He does not want NG tube placed and has no current nausea/vomiting.  Pain is minimal at time of examination.  He is on po depakote for years for seizure d/o and will be on IV keppra here until able to take po.  Bowel sounds are present but he denies passing flatus/bowel movement.  Will continue with supportive care.   Patient denies ever being diagnosed with COPD, diagnosis removed from chart.  11/19 Patient states he still feels poorly this am when seen, he denies passing flatus.  KUB confirms continued bowel obstruction so I ordered small bowel follow through.  General surgery to consult today, discussed with Dr Osborne, patient now agrees to NG tube placement for decompression.  11/20 Patient states he continues to feel horrible.  NG tube placed by both the daytime and  nighttime RNs and were easily inserted.  Unfortunately the patient pulled them out each time and he states he did it because he didn't like it.  I've explained to him the importance of the tube yet again and he agrees to insertion and states he will leave it in.  He did have nose bleeding with one of the insertions - that would explain the increase in BUN with normal Cr.     Addendum:  Spoke with daughter and wife outside of the room, patient recently had extensive dental surgery and is supposed to be on augmentin for 10 days post operative, given NPO status, I will start him on Unasyn.  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  Patient is refusing further attempts at NG tube placement after pulling it out this afternoon for a 3rd time.      I have discussed this patient's plan of care and discharge plan at IDT rounds today with Case Management, Nursing, Nursing leadership, and other members of the IDT team.    Consultants/Specialty  General surgery - Osborne    Code Status  Full Code    Disposition  Patient is not medically cleared for discharge.   Anticipate discharge to to home with close outpatient follow-up.  I have placed the appropriate orders for post-discharge needs.    Review of Systems  Review of Systems   Constitutional:  Negative for chills and fever.   HENT:  Negative for congestion and sore throat.    Eyes:  Negative for blurred vision and photophobia.   Respiratory:  Negative for cough and shortness of breath.    Cardiovascular:  Negative for chest pain, claudication and leg swelling.   Gastrointestinal:  Positive for abdominal pain (global lower abdomen). Negative for constipation, diarrhea, heartburn, nausea and vomiting.   Genitourinary:  Negative for dysuria and hematuria.   Musculoskeletal:  Negative for joint pain and myalgias.   Skin:  Negative for itching and rash.   Neurological:  Negative for  dizziness, sensory change, speech change, weakness and headaches.   Psychiatric/Behavioral:  Negative for depression. The patient is not nervous/anxious and does not have insomnia.       Physical Exam  Temp:  [36.7 °C (98 °F)-37.6 °C (99.6 °F)] 37.1 °C (98.7 °F)  Pulse:  [84-88] 84  Resp:  [17-20] 20  BP: (127-135)/(85-89) 135/89  SpO2:  [90 %-92 %] 91 %    Physical Exam  Vitals and nursing note reviewed.   Constitutional:       General: He is not in acute distress.     Appearance: Normal appearance.   HENT:      Head: Normocephalic and atraumatic.   Eyes:      General: No scleral icterus.     Extraocular Movements: Extraocular movements intact.   Cardiovascular:      Rate and Rhythm: Normal rate and regular rhythm.      Pulses: Normal pulses.      Heart sounds: Normal heart sounds. No murmur heard.  Pulmonary:      Effort: Pulmonary effort is normal. No respiratory distress.      Breath sounds: Normal breath sounds. No wheezing, rhonchi or rales.   Abdominal:      General: Abdomen is flat. Bowel sounds are normal. There is no distension.      Palpations: Abdomen is soft.      Tenderness: There is abdominal tenderness (global tenderness). There is no rebound.   Musculoskeletal:         General: No swelling or tenderness.      Cervical back: Normal range of motion and neck supple.   Lymphadenopathy:      Cervical: No cervical adenopathy.   Skin:     Coloration: Skin is not jaundiced.      Findings: No erythema.   Neurological:      General: No focal deficit present.      Mental Status: He is alert and oriented to person, place, and time. Mental status is at baseline.      Cranial Nerves: No cranial nerve deficit.   Psychiatric:         Mood and Affect: Mood normal.         Behavior: Behavior normal.       Fluids    Intake/Output Summary (Last 24 hours) at 11/20/2022 1054  Last data filed at 11/20/2022 0530  Gross per 24 hour   Intake --   Output 1980 ml   Net -1980 ml         Laboratory  Recent Labs      11/18/22  0602 11/19/22  0138 11/20/22  0220   WBC 10.5 4.9 4.1*   RBC 4.82 4.35* 4.39*   HEMOGLOBIN 16.1 14.2 14.7   HEMATOCRIT 46.4 43.4 42.8   MCV 96.3 99.8* 97.5   MCH 33.4* 32.6 33.5*   MCHC 34.7 32.7* 34.3   RDW 46.3 48.0 46.1   PLATELETCT 229 189 192   MPV 9.6 10.2 10.4       Recent Labs     11/18/22  0602 11/19/22  0138 11/20/22  0220   SODIUM 138 138 144   POTASSIUM 4.7 4.4 3.9   CHLORIDE 103 107 108   CO2 24 21 25   GLUCOSE 118* 99 124*   BUN 24* 24* 34*   CREATININE 0.94 0.87 0.96   CALCIUM 9.1 8.4 8.7                     Imaging  DX-ABDOMEN FOR TUBE PLACEMENT   Final Result      Enteric tube has been placed and the tip projects over the stomach.      [id number] VIEW   Final Result      1.  Dilated loops of small bowel consistent with small bowel obstruction.      DX-SMALL BOWEL SERIES    (Results Pending)          Assessment/Plan  * SBO (small bowel obstruction) (HCC)- (present on admission)  Assessment & Plan  - Patient does have a partial small bowel obstruction at the anastomosis from his previous hemicolectomy  -Patient currently is no longer vomiting, did refuse an NG at the outside facility  -Patient reporting no flatus and that his pain feels like a bubble that needs to pop, LLQ tenderness.  -Keep patient n.p.o.  -IV narcotics as needed  -General surgery consulting - discussed with Dr Osborne  - SBFT completed, read pending.  - NG tube placed twice yesterday followed by patient pulling it out soon after.  I re-emphasized the importance of this decompression and he agrees to placement and states he will leave it in.      Leukocytosis- (present on admission)  Assessment & Plan  - Likely reactive, no additional signs or symptoms to suggest bacterial infection  -No antibiotics needed at this time  -Obtain CBC    Dyslipidemia- (present on admission)  Assessment & Plan  - Restart home statin when able    Anxiety- (present on admission)  Assessment & Plan  - Is on Klonopin twice a day, patient is unsure  whether that is as needed or scheduled  -Start as needed Ativan  -Restart home Klonopin when able    Seizure disorder (HCC)- (present on admission)  Assessment & Plan  - Patient does take oral Depakote, national shortage of IV valproate, will start IV keppra then resume depakote when taking PO.         VTE prophylaxis: SCDs/TEDs    I have performed a physical exam and reviewed and updated ROS and Plan today (11/20/2022). In review of yesterday's note (11/19/2022), there are no changes except as documented above.

## 2022-11-20 NOTE — PROGRESS NOTES
"Surgical Progress Note:    HD#2 psbo    SBFT in progress.    Feeling the same as yesterday. No BM or flatus.    \"I don't know why the NG is out\"    Nauseated and retching.    PE:  /89   Pulse 84   Temp 37.1 °C (98.7 °F) (Oral)   Resp 20   Wt 60.7 kg (133 lb 13.1 oz)   SpO2 91%     I/O:   Intake/Output Summary (Last 24 hours) at 11/20/2022 0820  Last data filed at 11/20/2022 0530  Gross per 24 hour   Intake --   Output 1980 ml   Net -1980 ml         Emesis 1130    GEN: NAD  COR: RRR  PULM: CTAB  ABD: distended, less than yesterday, soft, NT  EXT: WWP    Labs:  Recent Labs     11/18/22  0602 11/19/22 0138 11/20/22 0220   WBC 10.5 4.9 4.1*   RBC 4.82 4.35* 4.39*   HEMOGLOBIN 16.1 14.2 14.7   HEMATOCRIT 46.4 43.4 42.8   MCV 96.3 99.8* 97.5   MCH 33.4* 32.6 33.5*   RDW 46.3 48.0 46.1   PLATELETCT 229 189 192   MPV 9.6 10.2 10.4   NEUTSPOLYS 81.10*  --   --    LYMPHOCYTES 5.70*  --   --    MONOCYTES 12.80  --   --    EOSINOPHILS 0.00  --   --    BASOPHILS 0.20  --   --      Recent Labs     11/18/22  0602 11/19/22 0138 11/20/22  0220   SODIUM 138 138 144   POTASSIUM 4.7 4.4 3.9   CHLORIDE 103 107 108   CO2 24 21 25   GLUCOSE 118* 99 124*   BUN 24* 24* 34*         A/P: HD#2 psbo  - await SBFT results   - replace NG tube PALOMOP     America Osborne M.D.  [facility]  [phone]        "

## 2022-11-20 NOTE — PROGRESS NOTES
"Pt pulled NGT out and stated he doesn't want it anymore, \"too many tubes connected to me.\" Total of 450 cc bilious output collected. Hospitalist aware.  "

## 2022-11-20 NOTE — PROGRESS NOTES
Order rec'd to place NGT to low continuous suction. Multiple attempts to place NGT made after medicating pt with pain med, ativan, and finally lidocaine to numb nasal cavity. NGT successfully placed. Pt c/o discomfort and asking how long NGT would dwell. Explained to pt that it was needed to help with bowels, and MD would determine how long it needed to remain in place. Pt verbalized understanding. While rad tech was performing KUB, pt pulled NGT out and stated that he didn't want it in anymore. Notified hospitalist. Instructed to let pt calm down and have night RN attempt to reinsert NGT.

## 2022-11-20 NOTE — CARE PLAN
The patient is Watcher - Medium risk of patient condition declining or worsening    Shift Goals  Clinical Goals: Pt will have BM/pass flatus this shift.  Patient Goals: to feel better  Family Goals: Pt will be assisted with ambulation as per spouse pt has gen weakness    Progress made toward(s) clinical / shift goals:      Patient is not progressing towards the following goals:Pt unable to pass flatus or have BM today. Bowel sounds present and pt c/o tenderness/pain to abdomen.

## 2022-11-21 ENCOUNTER — APPOINTMENT (OUTPATIENT)
Dept: RADIOLOGY | Facility: MEDICAL CENTER | Age: 77
DRG: 329 | End: 2022-11-21
Attending: INTERNAL MEDICINE
Payer: MEDICARE

## 2022-11-21 LAB
ANION GAP SERPL CALC-SCNC: 12 MMOL/L (ref 7–16)
BUN SERPL-MCNC: 32 MG/DL (ref 8–22)
CALCIUM SERPL-MCNC: 8.2 MG/DL (ref 8.4–10.2)
CHLORIDE SERPL-SCNC: 110 MMOL/L (ref 96–112)
CO2 SERPL-SCNC: 26 MMOL/L (ref 20–33)
CREAT SERPL-MCNC: 0.88 MG/DL (ref 0.5–1.4)
ERYTHROCYTE [DISTWIDTH] IN BLOOD BY AUTOMATED COUNT: 46.2 FL (ref 35.9–50)
GFR SERPLBLD CREATININE-BSD FMLA CKD-EPI: 89 ML/MIN/1.73 M 2
GLUCOSE SERPL-MCNC: 111 MG/DL (ref 65–99)
HCT VFR BLD AUTO: 41.6 % (ref 42–52)
HGB BLD-MCNC: 13.8 G/DL (ref 14–18)
MCH RBC QN AUTO: 32.6 PG (ref 27–33)
MCHC RBC AUTO-ENTMCNC: 33.2 G/DL (ref 33.7–35.3)
MCV RBC AUTO: 98.3 FL (ref 81.4–97.8)
PLATELET # BLD AUTO: 206 K/UL (ref 164–446)
PMV BLD AUTO: 10 FL (ref 9–12.9)
POTASSIUM SERPL-SCNC: 3.5 MMOL/L (ref 3.6–5.5)
RBC # BLD AUTO: 4.23 M/UL (ref 4.7–6.1)
SODIUM SERPL-SCNC: 148 MMOL/L (ref 135–145)
WBC # BLD AUTO: 3.4 K/UL (ref 4.8–10.8)

## 2022-11-21 PROCEDURE — 770006 HCHG ROOM/CARE - MED/SURG/GYN SEMI*

## 2022-11-21 PROCEDURE — 700111 HCHG RX REV CODE 636 W/ 250 OVERRIDE (IP): Performed by: INTERNAL MEDICINE

## 2022-11-21 PROCEDURE — 700105 HCHG RX REV CODE 258: Performed by: INTERNAL MEDICINE

## 2022-11-21 PROCEDURE — 80048 BASIC METABOLIC PNL TOTAL CA: CPT

## 2022-11-21 PROCEDURE — 97166 OT EVAL MOD COMPLEX 45 MIN: CPT

## 2022-11-21 PROCEDURE — 99232 SBSQ HOSP IP/OBS MODERATE 35: CPT | Performed by: INTERNAL MEDICINE

## 2022-11-21 PROCEDURE — 36415 COLL VENOUS BLD VENIPUNCTURE: CPT

## 2022-11-21 PROCEDURE — 85027 COMPLETE CBC AUTOMATED: CPT

## 2022-11-21 PROCEDURE — 94760 N-INVAS EAR/PLS OXIMETRY 1: CPT

## 2022-11-21 RX ORDER — ACETAMINOPHEN 325 MG/1
650 TABLET ORAL EVERY 6 HOURS PRN
Status: DISCONTINUED | OUTPATIENT
Start: 2022-11-21 | End: 2022-11-26

## 2022-11-21 RX ADMIN — LORAZEPAM 0.5 MG: 2 INJECTION INTRAMUSCULAR; INTRAVENOUS at 08:48

## 2022-11-21 RX ADMIN — SODIUM CHLORIDE: 9 INJECTION, SOLUTION INTRAVENOUS at 11:34

## 2022-11-21 RX ADMIN — AMPICILLIN AND SULBACTAM 3 G: 1; 2 INJECTION, POWDER, FOR SOLUTION INTRAMUSCULAR; INTRAVENOUS at 00:33

## 2022-11-21 RX ADMIN — LORAZEPAM 0.5 MG: 2 INJECTION INTRAMUSCULAR; INTRAVENOUS at 18:28

## 2022-11-21 RX ADMIN — SODIUM CHLORIDE: 9 INJECTION, SOLUTION INTRAVENOUS at 23:08

## 2022-11-21 RX ADMIN — SODIUM CHLORIDE: 9 INJECTION, SOLUTION INTRAVENOUS at 00:32

## 2022-11-21 RX ADMIN — AMPICILLIN AND SULBACTAM 3 G: 1; 2 INJECTION, POWDER, FOR SOLUTION INTRAMUSCULAR; INTRAVENOUS at 17:45

## 2022-11-21 RX ADMIN — AMPICILLIN AND SULBACTAM 3 G: 1; 2 INJECTION, POWDER, FOR SOLUTION INTRAMUSCULAR; INTRAVENOUS at 11:36

## 2022-11-21 RX ADMIN — ONDANSETRON 4 MG: 2 INJECTION INTRAMUSCULAR; INTRAVENOUS at 02:27

## 2022-11-21 RX ADMIN — LORAZEPAM 0.5 MG: 2 INJECTION INTRAMUSCULAR; INTRAVENOUS at 12:44

## 2022-11-21 RX ADMIN — AMPICILLIN AND SULBACTAM 3 G: 1; 2 INJECTION, POWDER, FOR SOLUTION INTRAMUSCULAR; INTRAVENOUS at 23:11

## 2022-11-21 RX ADMIN — LEVETIRACETAM 500 MG: 100 INJECTION, SOLUTION INTRAVENOUS at 08:19

## 2022-11-21 RX ADMIN — ONDANSETRON 4 MG: 2 INJECTION INTRAMUSCULAR; INTRAVENOUS at 09:35

## 2022-11-21 RX ADMIN — LEVETIRACETAM 500 MG: 100 INJECTION, SOLUTION INTRAVENOUS at 21:29

## 2022-11-21 RX ADMIN — AMPICILLIN AND SULBACTAM 3 G: 1; 2 INJECTION, POWDER, FOR SOLUTION INTRAMUSCULAR; INTRAVENOUS at 05:22

## 2022-11-21 ASSESSMENT — PATIENT HEALTH QUESTIONNAIRE - PHQ9
SUM OF ALL RESPONSES TO PHQ9 QUESTIONS 1 AND 2: 0
1. LITTLE INTEREST OR PLEASURE IN DOING THINGS: NOT AT ALL
2. FEELING DOWN, DEPRESSED, IRRITABLE, OR HOPELESS: NOT AT ALL

## 2022-11-21 ASSESSMENT — GAIT ASSESSMENTS: DISTANCE (FEET): 100

## 2022-11-21 ASSESSMENT — ENCOUNTER SYMPTOMS
DIARRHEA: 0
VOMITING: 0
INSOMNIA: 0
COUGH: 0
HEADACHES: 0
ABDOMINAL PAIN: 1
HEARTBURN: 0
CLAUDICATION: 0
DIZZINESS: 0
WEAKNESS: 0
SPEECH CHANGE: 0
SORE THROAT: 0
BLURRED VISION: 0
CHILLS: 0
NAUSEA: 0
DEPRESSION: 0
NERVOUS/ANXIOUS: 0
SHORTNESS OF BREATH: 0
SENSORY CHANGE: 0
CONSTIPATION: 0
PHOTOPHOBIA: 0
FEVER: 0
MYALGIAS: 0

## 2022-11-21 ASSESSMENT — COGNITIVE AND FUNCTIONAL STATUS - GENERAL
DRESSING REGULAR UPPER BODY CLOTHING: A LITTLE
DAILY ACTIVITIY SCORE: 18
PERSONAL GROOMING: A LITTLE
HELP NEEDED FOR BATHING: A LITTLE
EATING MEALS: A LITTLE
DRESSING REGULAR LOWER BODY CLOTHING: A LITTLE
TOILETING: A LITTLE
SUGGESTED CMS G CODE MODIFIER DAILY ACTIVITY: CK

## 2022-11-21 ASSESSMENT — ACTIVITIES OF DAILY LIVING (ADL): TOILETING: INDEPENDENT

## 2022-11-21 ASSESSMENT — PAIN DESCRIPTION - PAIN TYPE
TYPE: ACUTE PAIN
TYPE: ACUTE PAIN

## 2022-11-21 NOTE — CARE PLAN
The patient is Stable - Low risk of patient condition declining or worsening    Shift Goals  Clinical Goals: Pt will tolerate pain at 3/10 or less, tolerate NPO diet.  Patient Goals: Pt will able to sleep comfortably with tolerable pain, free from fall, tolerated NPO.  Family Goals: n/a    Progress made toward(s) clinical / shift goals:  Pt tolerated NPO diet, provided oral care. Pt reported abdominal pain and N/V, medicated per MAR. Bilious output of 150 ml. Pt did not sustain any falls during this shift.     Patient is not progressing towards the following goals:

## 2022-11-21 NOTE — CARE PLAN
The patient is Stable - Low risk of patient condition declining or worsening    Shift Goals  Clinical Goals: Insert NG tube  Patient Goals: eat/drink  Family Goals: n/a    Progress made toward(s) clinical / shift goals:    Problem: Psychosocial  Goal: Patient's level of anxiety will decrease  Outcome: Progressing     Problem: Communication  Goal: The ability to communicate needs accurately and effectively will improve  Outcome: Progressing     Problem: Pain - Standard  Goal: Alleviation of pain or a reduction in pain to the patient’s comfort goal  Outcome: Progressing     Problem: Fall Risk  Goal: Patient will remain free from falls  Outcome: Progressing       Patient is not progressing towards the following goals:      Problem: Bowel Elimination  Goal: Establish and maintain regular bowel function  Outcome: Not Progressing   Has not had a BM since 11/16

## 2022-11-21 NOTE — DISCHARGE PLANNING
Case Management Discharge Planning    Admission Date: 11/18/2022  GMLOS: 2.3  ALOS: 3    6-Clicks ADL Score: 18  6-Clicks Mobility Score: 19      Anticipated Discharge Dispo: Discharge Disposition: D/T to home under HHA care in anticipation of covered skilled care (06)    DME Needed: No    Action(s) Taken: Updated Provider/Nurse on Discharge Plan    1014: RN CM requested HH order be placed.      1328: IRINA DOWNEY called [facility] to inquire about HH agencies in their area.  No answer,  left requesting call back.     Escalations Completed: Provider    Medically Clear: No    Next Steps: Medical clearance, HH acceptance    Barriers to Discharge: Medical clearance

## 2022-11-21 NOTE — PROGRESS NOTES
[facility] Medicine Daily Progress Note    Date of Service  11/21/2022    Chief Complaint  Hernán Walters is a 77 y.o. male admitted 11/18/2022 with abdominal pain, nausea and vomiting.     Hospital Course  Hernán Walters is a 77 y.o. male who presented 11/18/2022 with abdominal pain.  Patient states his pain initially started approximately a week ago however has been worsening.  He states its in his entire abdomen, sharp at times, can be severe.  He also states he began having nausea and vomiting and can no longer keep anything down.  Because of the ongoing nature of this and the worsening nature, he presented to the ER at an outlying facility.  There they did imaging and noted a small bowel obstruction.  Patient was transferred here for higher level of care.  Patient noted no bleeding.  He does have a history of appendectomy and hemicolectomy approximately 4 years ago.  Patient states he did have a tooth pulled and has been antibiotics on this for approximately 7 days.  He was transferred from outside ER to here at [facility] for higher level of care.      Interval Problem Update  11/18 Patient admitted earlier this am for partial small bowel obstruction.  He does not want NG tube placed and has no current nausea/vomiting.  Pain is minimal at time of examination.  He is on po depakote for years for seizure d/o and will be on IV keppra here until able to take po.  Bowel sounds are present but he denies passing flatus/bowel movement.  Will continue with supportive care.   Patient denies ever being diagnosed with COPD, diagnosis removed from chart.  11/19 Patient states he still feels poorly this am when seen, he denies passing flatus.  KUB confirms continued bowel obstruction so I ordered small bowel follow through.  General surgery to consult today, discussed with Dr Osborne, patient now agrees to NG tube placement for decompression.  11/20 Patient states he continues to feel horrible.  NG tube placed by both the daytime and  nighttime RNs and were easily inserted.  Unfortunately the patient pulled them out each time and he states he did it because he didn't like it.  I've explained to him the importance of the tube yet again and he agrees to insertion and states he will leave it in.  He did have nose bleeding with one of the insertions - that would explain the increase in BUN with normal Cr. Spoke with daughter and wife outside of the room, patient recently had extensive dental surgery and is supposed to be on augmentin for 10 days post operative, given NPO status, I will start him on Unasyn.  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  Patient is refusing further attempts at NG tube placement after pulling it out this afternoon for a 3rd time.    11/21 Patient allowed NG tube to be placed this am after discussion with Dr Shultz.  IF surgery does prove necessary, he would have NG tube for minimum of 3 days postoperatively and with this new information, he is leaving the NG tube in place.  Family at bedside and update and are helping make sure patient doesn't pull out the tube.   Bilious drainage in NG cannister.     I have discussed this patient's plan of care and discharge plan at IDT rounds today with Case Management, Nursing, Nursing leadership, and other members of the IDT team.    Consultants/Specialty  General surgery - Osborne    Code Status  Full Code    Disposition  Patient is not medically cleared for discharge.   Anticipate discharge to to home with close outpatient follow-up.  I have placed the appropriate orders for post-discharge needs.    Review of Systems  Review of Systems   Constitutional:  Negative for chills and fever.   HENT:  Negative for congestion and sore throat.    Eyes:  Negative for blurred vision and photophobia.   Respiratory:  Negative for cough and shortness of breath.    Cardiovascular:  Negative for chest pain,  claudication and leg swelling.   Gastrointestinal:  Positive for abdominal pain (global lower abdomen). Negative for constipation, diarrhea, heartburn, nausea and vomiting.   Genitourinary:  Negative for dysuria and hematuria.   Musculoskeletal:  Negative for joint pain and myalgias.   Skin:  Negative for itching and rash.   Neurological:  Negative for dizziness, sensory change, speech change, weakness and headaches.   Psychiatric/Behavioral:  Negative for depression. The patient is not nervous/anxious and does not have insomnia.       Physical Exam  Temp:  [36.7 °C (98 °F)-37 °C (98.6 °F)] 36.7 °C (98.1 °F)  Pulse:  [73-80] 76  Resp:  [18-20] 20  BP: (114-129)/(69-80) 129/69  SpO2:  [93 %-94 %] 94 %    Physical Exam  Vitals and nursing note reviewed.   Constitutional:       General: He is not in acute distress.     Appearance: Normal appearance.   HENT:      Head: Normocephalic and atraumatic.   Eyes:      General: No scleral icterus.     Extraocular Movements: Extraocular movements intact.   Cardiovascular:      Rate and Rhythm: Normal rate and regular rhythm.      Pulses: Normal pulses.      Heart sounds: Normal heart sounds. No murmur heard.  Pulmonary:      Effort: Pulmonary effort is normal. No respiratory distress.      Breath sounds: Normal breath sounds. No wheezing, rhonchi or rales.   Abdominal:      General: Abdomen is flat. Bowel sounds are normal. There is no distension.      Palpations: Abdomen is soft.      Tenderness: There is abdominal tenderness (global tenderness). There is no rebound.   Musculoskeletal:         General: No swelling or tenderness.      Cervical back: Normal range of motion and neck supple.   Lymphadenopathy:      Cervical: No cervical adenopathy.   Skin:     Coloration: Skin is not jaundiced.      Findings: No erythema.   Neurological:      General: No focal deficit present.      Mental Status: He is alert and oriented to person, place, and time. Mental status is at baseline.       Cranial Nerves: No cranial nerve deficit.   Psychiatric:         Mood and Affect: Mood normal.         Behavior: Behavior normal.       Fluids    Intake/Output Summary (Last 24 hours) at 11/21/2022 1026  Last data filed at 11/21/2022 0900  Gross per 24 hour   Intake 2623.39 ml   Output 1600 ml   Net 1023.39 ml         Laboratory  Recent Labs     11/19/22  0138 11/20/22  0220 11/21/22  0255   WBC 4.9 4.1* 3.4*   RBC 4.35* 4.39* 4.23*   HEMOGLOBIN 14.2 14.7 13.8*   HEMATOCRIT 43.4 42.8 41.6*   MCV 99.8* 97.5 98.3*   MCH 32.6 33.5* 32.6   MCHC 32.7* 34.3 33.2*   RDW 48.0 46.1 46.2   PLATELETCT 189 192 206   MPV 10.2 10.4 10.0       Recent Labs     11/19/22 0138 11/20/22 0220 11/21/22  0255   SODIUM 138 144 148*   POTASSIUM 4.4 3.9 3.5*   CHLORIDE 107 108 110   CO2 21 25 26   GLUCOSE 99 124* 111*   BUN 24* 34* 32*   CREATININE 0.87 0.96 0.88   CALCIUM 8.4 8.7 8.2*                     Imaging  DX-SMALL BOWEL SERIES   Final Result      1.  Difficult to definitively identify contrast within the colon 24 hour images due to dilution of contrast and suction of contrast from stomach.      2.  There is persistent dilated small bowel suggesting small bowel obstruction. Consider CT scan for further evaluation.      DX-ABDOMEN FOR TUBE PLACEMENT   Final Result      Enteric tube has been placed and the tip projects over the stomach.      [id number] VIEW   Final Result      1.  Dilated loops of small bowel consistent with small bowel obstruction.      DX-ABDOMEN FOR TUBE PLACEMENT    (Results Pending)          Assessment/Plan  * SBO (small bowel obstruction) ([facility])- (present on admission)  Assessment & Plan  - Patient does have a partial small bowel obstruction at the anastomosis from his previous hemicolectomy  -Patient currently is no longer vomiting, did refuse an NG at the outside facility  -Patient reporting no flatus and that his pain feels like a bubble that needs to pop, LLQ tenderness.  -Keep patient n.p.o.  -IV  narcotics as needed  -General surgery consulting - discussed with Dr Osborne/Carrington  - SBFT completed, read pending.  - NG tube placed and family at bedside making sure patient leaves the tube in his nose. No current surgical plans unless he fails decompression with NG tube.  IF surgery does prove necessary, NG tube will be in place post operative 3-5 days minimum.        Leukocytosis- (present on admission)  Assessment & Plan  - Likely reactive, no additional signs or symptoms to suggest bacterial infection  -No antibiotics needed at this time  -Obtain CBC    Dyslipidemia- (present on admission)  Assessment & Plan  - Restart home statin when able    Anxiety- (present on admission)  Assessment & Plan  - Is on Klonopin twice a day, patient is unsure whether that is as needed or scheduled  -Start as needed Ativan  -Restart home Klonopin when able    Seizure disorder (HCC)- (present on admission)  Assessment & Plan  - Patient does take oral Depakote, national shortage of IV valproate, will start IV keppra then resume depakote when taking PO.         VTE prophylaxis: SCDs/TEDs    I have performed a physical exam and reviewed and updated ROS and Plan today (11/21/2022). In review of yesterday's note (11/20/2022), there are no changes except as documented above.

## 2022-11-21 NOTE — PROGRESS NOTES
Attempted to insert NGT again. Premedicated pt as ordered. Pt was unable to tolerate procedure and pulled NGT out of my hand and out of nose. Pt wife and daughter at bedside today. Was informed by family that pt has some form of a facial phobia and can tolerate anyone or thing touching his face. MD aware.

## 2022-11-21 NOTE — PROGRESS NOTES
S: Hernán Walters  is a 77 y.o.  male admitted for bowel obstruction.  Patient keeps self DC'd his NG tube.  Limiting his ability to recover nonoperatively.      O:  /69   Pulse 76   Temp 36.7 °C (98.1 °F) (Oral)   Resp 20   Wt 60.7 kg (133 lb 13.1 oz)   SpO2 94%   Intake/Output                               11/19/22 0700 - 11/20/22 0659 11/20/22 0700 - 11/21/22 0659 11/21/22 0700 - 11/22/22 0659     0700-1859 1900-0659 Total 0700-1859 1900-0659 Total 0700-1859 1900-0659 Total                    Intake    Total Intake -- -- -- -- -- -- -- -- --       Output    Urine  300  100 400  500  400 900  --  -- --    Number of Times Voided 2 x 1 x 3 x 2 x 3 x 5 x -- -- --    Urine Void (mL) 300 100 400 500 400 900 -- -- --    Emesis  600  530 1130  350  550 900  --  -- --    Emesis  350 550 900 -- -- --    Stool  --  -- --  --  0 0  --  -- --    Number of Times Stooled -- -- -- -- 0 x 0 x -- -- --    Measurable Stool (mL) -- -- -- -- 0 0 -- -- --    Emesis/NG output  --  450 450  --  -- --  --  -- --    Output (mL) ([REMOVED] Enteral Tube 11/19/22 14 Fr. Left nare) -- 450 450 -- -- -- -- -- --    Total Output 900 1080 1980  -- -- --       Net I/O     -900 -1080 -1980 -850 -950 -1800 -- -- --          Recent Labs     11/19/22  0138 11/20/22  0220 11/21/22  0255   SODIUM 138 144 148*   POTASSIUM 4.4 3.9 3.5*   CHLORIDE 107 108 110   CO2 21 25 26   GLUCOSE 99 124* 111*   BUN 24* 34* 32*   CREATININE 0.87 0.96 0.88   CALCIUM 8.4 8.7 8.2*     Recent Labs     11/19/22  0138 11/20/22  0220 11/21/22  0255   WBC 4.9 4.1* 3.4*   RBC 4.35* 4.39* 4.23*   HEMOGLOBIN 14.2 14.7 13.8*   HEMATOCRIT 43.4 42.8 41.6*   MCV 99.8* 97.5 98.3*   MCH 32.6 33.5* 32.6   MCHC 32.7* 34.3 33.2*   RDW 48.0 46.1 46.2   PLATELETCT 189 192 206   MPV 10.2 10.4 10.0       Alert and Oriented x3, No Acute Distress  Normal Respiratory Effort  Abdomen soft, distended  Extremities warm and well perfused    A/P:  Long  discussion had this a.m. with bedside nurse and patient.  Patient apparently does not like things around his face.  Unfortunately a surgical option for resolution of his bowel obstruction includes an NG tube for 3 to 5 days postoperatively.  It is concerning patient will not be able to tolerate this.  Again recommend replacing his NG tube as patient not a true trial of decompression for resolution of his symptoms.    Junior Shultz MD  [facility]

## 2022-11-21 NOTE — FACE TO FACE
Face to Face Supporting Documentation - Home Health    The encounter with this patient was in whole or in part the primary reason for home health admission.    Date of encounter:   Patient:                    MRN:                       YOB: 2020  Hernán Walters  [id number]  1945     Home health to see patient for:  Skilled Nursing care for assessment, interventions & education, Physical Therapy evaluation and treatment, and Occupational therapy evaluation and treatment    Skilled need for:  New Onset Medical Diagnosis bowel obstruction    Skilled nursing interventions to include:  Comment: .    Homebound status evidenced by:  Needs the assistance of another person in order to leave the home. Leaving home requires a considerable and taxing effort. There is a normal inability to leave the home.    Community Physician to provide follow up care: No primary care provider on file.     Optional Interventions? No      I certify the face to face encounter for this home health care referral meets the CMS requirements and the encounter/clinical assessment with the patient was, in whole, or in part, for the medical condition(s) listed above, which is the primary reason for home health care. Based on my clinical findings: the service(s) are medically necessary, support the need for home health care, and the homebound criteria are met.  I certify that this patient has had a face to face encounter by myself.  Kelin Tovar D.O. - NPI: [id number]

## 2022-11-22 ENCOUNTER — APPOINTMENT (OUTPATIENT)
Dept: RADIOLOGY | Facility: MEDICAL CENTER | Age: 77
DRG: 329 | End: 2022-11-22
Attending: INTERNAL MEDICINE
Payer: MEDICARE

## 2022-11-22 LAB
ALBUMIN SERPL BCP-MCNC: 2.9 G/DL (ref 3.2–4.9)
ALBUMIN/GLOB SERPL: 1.4 G/DL
ALP SERPL-CCNC: 37 U/L (ref 30–99)
ALT SERPL-CCNC: 14 U/L (ref 2–50)
ANION GAP SERPL CALC-SCNC: 12 MMOL/L (ref 7–16)
AST SERPL-CCNC: 22 U/L (ref 12–45)
BILIRUB SERPL-MCNC: 0.4 MG/DL (ref 0.1–1.5)
BUN SERPL-MCNC: 29 MG/DL (ref 8–22)
CALCIUM SERPL-MCNC: 8.2 MG/DL (ref 8.4–10.2)
CHLORIDE SERPL-SCNC: 113 MMOL/L (ref 96–112)
CO2 SERPL-SCNC: 22 MMOL/L (ref 20–33)
CREAT SERPL-MCNC: 0.81 MG/DL (ref 0.5–1.4)
ERYTHROCYTE [DISTWIDTH] IN BLOOD BY AUTOMATED COUNT: 47.6 FL (ref 35.9–50)
GFR SERPLBLD CREATININE-BSD FMLA CKD-EPI: 92 ML/MIN/1.73 M 2
GLOBULIN SER CALC-MCNC: 2.1 G/DL (ref 1.9–3.5)
GLUCOSE SERPL-MCNC: 88 MG/DL (ref 65–99)
HCT VFR BLD AUTO: 39.8 % (ref 42–52)
HGB BLD-MCNC: 13.9 G/DL (ref 14–18)
MCH RBC QN AUTO: 34.8 PG (ref 27–33)
MCHC RBC AUTO-ENTMCNC: 34.9 G/DL (ref 33.7–35.3)
MCV RBC AUTO: 99.5 FL (ref 81.4–97.8)
PLATELET # BLD AUTO: 223 K/UL (ref 164–446)
PMV BLD AUTO: 10.6 FL (ref 9–12.9)
POTASSIUM SERPL-SCNC: 3.8 MMOL/L (ref 3.6–5.5)
PROT SERPL-MCNC: 5 G/DL (ref 6–8.2)
RBC # BLD AUTO: 4 M/UL (ref 4.7–6.1)
SODIUM SERPL-SCNC: 147 MMOL/L (ref 135–145)
WBC # BLD AUTO: 7 K/UL (ref 4.8–10.8)

## 2022-11-22 PROCEDURE — 700102 HCHG RX REV CODE 250 W/ 637 OVERRIDE(OP): Performed by: INTERNAL MEDICINE

## 2022-11-22 PROCEDURE — 770006 HCHG ROOM/CARE - MED/SURG/GYN SEMI*

## 2022-11-22 PROCEDURE — 36415 COLL VENOUS BLD VENIPUNCTURE: CPT

## 2022-11-22 PROCEDURE — 700105 HCHG RX REV CODE 258: Performed by: INTERNAL MEDICINE

## 2022-11-22 PROCEDURE — 93005 ELECTROCARDIOGRAM TRACING: CPT | Performed by: INTERNAL MEDICINE

## 2022-11-22 PROCEDURE — A9270 NON-COVERED ITEM OR SERVICE: HCPCS | Performed by: INTERNAL MEDICINE

## 2022-11-22 PROCEDURE — 99233 SBSQ HOSP IP/OBS HIGH 50: CPT | Performed by: INTERNAL MEDICINE

## 2022-11-22 PROCEDURE — 700111 HCHG RX REV CODE 636 W/ 250 OVERRIDE (IP): Performed by: INTERNAL MEDICINE

## 2022-11-22 PROCEDURE — 700101 HCHG RX REV CODE 250: Performed by: INTERNAL MEDICINE

## 2022-11-22 PROCEDURE — 80053 COMPREHEN METABOLIC PANEL: CPT

## 2022-11-22 PROCEDURE — 85027 COMPLETE CBC AUTOMATED: CPT

## 2022-11-22 RX ORDER — DEXTROSE AND SODIUM CHLORIDE 5; .45 G/100ML; G/100ML
INJECTION, SOLUTION INTRAVENOUS CONTINUOUS
Status: ACTIVE | OUTPATIENT
Start: 2022-11-22 | End: 2022-11-24

## 2022-11-22 RX ORDER — OLANZAPINE 5 MG/1
5 TABLET, ORALLY DISINTEGRATING ORAL EVERY EVENING
Status: CANCELLED | OUTPATIENT
Start: 2022-11-22

## 2022-11-22 RX ORDER — OLANZAPINE 5 MG/1
5 TABLET, ORALLY DISINTEGRATING ORAL EVERY EVENING
Status: DISCONTINUED | OUTPATIENT
Start: 2022-11-22 | End: 2022-11-23

## 2022-11-22 RX ADMIN — LEVETIRACETAM 500 MG: 100 INJECTION, SOLUTION INTRAVENOUS at 09:41

## 2022-11-22 RX ADMIN — LEVETIRACETAM 500 MG: 100 INJECTION, SOLUTION INTRAVENOUS at 20:16

## 2022-11-22 RX ADMIN — LIDOCAINE HYDROCHLORIDE 120 MG: 20 JELLY TOPICAL at 14:00

## 2022-11-22 RX ADMIN — SODIUM CHLORIDE: 9 INJECTION, SOLUTION INTRAVENOUS at 09:40

## 2022-11-22 RX ADMIN — HYDROMORPHONE HYDROCHLORIDE 0.5 MG: 1 INJECTION, SOLUTION INTRAMUSCULAR; INTRAVENOUS; SUBCUTANEOUS at 17:25

## 2022-11-22 RX ADMIN — AMPICILLIN AND SULBACTAM 3 G: 1; 2 INJECTION, POWDER, FOR SOLUTION INTRAMUSCULAR; INTRAVENOUS at 17:21

## 2022-11-22 RX ADMIN — OLANZAPINE 5 MG: 5 TABLET, ORALLY DISINTEGRATING ORAL at 17:27

## 2022-11-22 RX ADMIN — AMPICILLIN AND SULBACTAM 3 G: 1; 2 INJECTION, POWDER, FOR SOLUTION INTRAMUSCULAR; INTRAVENOUS at 12:20

## 2022-11-22 RX ADMIN — DEXTROSE AND SODIUM CHLORIDE: 5; 450 INJECTION, SOLUTION INTRAVENOUS at 17:35

## 2022-11-22 RX ADMIN — AMPICILLIN AND SULBACTAM 3 G: 1; 2 INJECTION, POWDER, FOR SOLUTION INTRAMUSCULAR; INTRAVENOUS at 05:25

## 2022-11-22 RX ADMIN — AMPICILLIN AND SULBACTAM 3 G: 1; 2 INJECTION, POWDER, FOR SOLUTION INTRAMUSCULAR; INTRAVENOUS at 23:40

## 2022-11-22 RX ADMIN — LORAZEPAM 0.5 MG: 2 INJECTION INTRAMUSCULAR; INTRAVENOUS at 12:59

## 2022-11-22 ASSESSMENT — ENCOUNTER SYMPTOMS
SHORTNESS OF BREATH: 0
EYE PAIN: 0
DIZZINESS: 0
BACK PAIN: 0
PALPITATIONS: 0
BLURRED VISION: 0
ABDOMINAL PAIN: 1
INSOMNIA: 0
MEMORY LOSS: 1
NERVOUS/ANXIOUS: 1
VOMITING: 0
HEADACHES: 0
COUGH: 0
CHILLS: 0
NAUSEA: 0
FEVER: 0

## 2022-11-22 NOTE — CARE PLAN
The patient is Stable - Low risk of patient condition declining or worsening    Shift Goals  Clinical Goals: Pt will manage to have NGT in place during this shift.  Patient Goals: Pt suzy able to sleep comfortably with minimal pain overnight.  Family Goals: PT's NGT remain in place.    Progress made toward(s) clinical / shift goals:  Pt unsuccessful with NGT placement overnight after pt educated the importance, pt refused new NGT to be inserted. Pt ambulated to the hallway with staff assistance. Pt able to sleep more than 6 hours overnight.    Patient is not progressing towards the following goals:      Problem: Knowledge Deficit - Standard  Goal: Patient and family/care givers will demonstrate understanding of plan of care, disease process/condition, diagnostic tests and medications  Outcome: Not Progressing     Problem: Bowel Elimination  Goal: Establish and maintain regular bowel function  Outcome: Not Progressing

## 2022-11-22 NOTE — PROGRESS NOTES
"       S: Hernán Walters  is a 77 y.o.  male admitted for bowel obstruction.  Patient keeps self DC'd his NG tube.  Limiting his ability to recover nonoperatively.      O:  /67   Pulse 62   Temp 37 °C (98.6 °F) (Oral)   Resp 17   Ht 1.727 m (5' 8\")   Wt 60.7 kg (133 lb 13.1 oz)   SpO2 98%   Intake/Output                               11/20/22 0700 - 11/21/22 0659 11/21/22 0700 - 11/22/22 0659 11/22/22 0700 - 11/23/22 0659     4264-0663 2221-7536 Total 5399-4869 2609-5434 Total 6498-5064 7933-9071 Total                    Intake    P.O.  --  -- --  --  -- --  0  -- 0    P.O. -- -- -- -- -- -- 0 -- 0    I.V.  --  -- --  2423.4  -- 2423.4  --  -- --    Volume (mL) (NS infusion) -- -- -- 2423.4 -- 2423.4 -- -- --    IV Piggyback  --  -- --  200  -- 200  --  -- --    Volume (mL) (ampicillin/sulbactam (UNASYN) 3 g in  mL IVPB) -- -- -- 200 -- 200 -- -- --    Total Intake -- -- -- 2623.4 -- 2623.4 0 -- 0       Output    Urine  500  400 900  200  700 900  150  -- 150    Number of Times Voided 2 x 3 x 5 x 1 x 2 x 3 x 1 x -- 1 x    Urine Void (mL) 500 400 900 200 700 900 150 -- 150    Emesis  350  550 900  300  -- 300  --  -- --    Emesis 350 550 900 300 -- 300 -- -- --    Stool  --  0 0  --  0 0  --  -- --    Number of Times Stooled -- 0 x 0 x 0 x 0 x 0 x -- -- --    Measurable Stool (mL) -- 0 0 -- 0 0 -- -- --    Emesis/NG output  --  -- --  1000  -- 1000  --  -- --    Output (mL) ([REMOVED] Enteral Tube 11/21/22 Nasogastric 14 Fr. Left nare) -- -- -- 1000 -- 1000 -- -- --    Total Output  0454 478 3885 150 -- 150       Net I/O     -850 -950 -1800 1123.4 -700 423.4 -150 -- -150          Recent Labs     11/20/22 0220 11/21/22 0255 11/22/22 0131   SODIUM 144 148* 147*   POTASSIUM 3.9 3.5* 3.8   CHLORIDE 108 110 113*   CO2 25 26 22   GLUCOSE 124* 111* 88   BUN 34* 32* 29*   CREATININE 0.96 0.88 0.81   CALCIUM 8.7 8.2* 8.2*     Recent Labs     11/20/22 0220 11/21/22 0255 11/22/22 0131   WBC " 4.1* 3.4* 7.0   RBC 4.39* 4.23* 4.00*   HEMOGLOBIN 14.7 13.8* 13.9*   HEMATOCRIT 42.8 41.6* 39.8*   MCV 97.5 98.3* 99.5*   MCH 33.5* 32.6 34.8*   MCHC 34.3 33.2* 34.9   RDW 46.1 46.2 47.6   PLATELETCT 192 206 223   MPV 10.4 10.0 10.6       Alert and Oriented x3, No Acute Distress  Normal Respiratory Effort  Abdomen soft, distended  Extremities warm and well perfused    A/P:  Long discussion had this a.m. with bedside nurse and patient.  Patient apparently does not like things around his face.  Unfortunately a surgical option for resolution of his bowel obstruction includes an NG tube for 3 to 5 days postoperatively.  It is concerning patient will not be able to tolerate this.  Again recommend replacing his NG tube as patient not a true trial of decompression for resolution of his symptoms.  Again we discussed extensively the reasons for NG tube placement patient understands.  If he cannot continue to tolerate this we will plan for OR in the morning but this is high risk given his inability to comply with appropriate care    Junior Shultz MD  [facility]

## 2022-11-22 NOTE — PROGRESS NOTES
0940 Assessment complete. MD talked with patient and family at bedside. Plan is to replace NG tube today. Will premedicate for anxiety, safety measures in place.

## 2022-11-22 NOTE — PROGRESS NOTES
Hospital Medicine Daily Progress Note    Date of Service  11/22/2022    Chief Complaint  Hernán Walters is a 77 y.o. male admitted 11/18/2022 with N/V and abdominal pain    Hospital Course  Hernán Walters is a 77 y.o. male who presented 11/18/2022 with abdominal pain.  Patient states his pain initially started approximately a week ago however has been worsening.  He states its in his entire abdomen, sharp at times, can be severe.  He also states he began having nausea and vomiting and can no longer keep anything down.  Because of the ongoing nature of this and the worsening nature, he presented to the ER at an outlying facility.  There they did imaging and noted a small bowel obstruction.  Patient was transferred here for higher level of care.  Patient noted no bleeding.  He does have a history of appendectomy and hemicolectomy approximately 4 years ago.  Patient states he did have a tooth pulled and has been antibiotics on this for approximately 7 days.  He was transferred from outside ER to here at [facility] for higher level of care.      Interval Problem Update  Patient pulled NGT overnight.  He has not passed stool or gas. Has abdominal pain, and feels very anxious.  Met with wife and daughter, they drive from [facility].  They agreed to retry another NGT.  If needed, for patient's safety and to continue treatment, they agreed to restraints with bilateral soft wrists. Wife wished to be notified if restraints are needed.    I ordered Zyprexa oral dissolving tablet for night time, pending ECG.  [facility] following, non-surgical management at this time.    I have discussed this patient's plan of care and discharge plan at IDT rounds today with Case Management, Nursing, Nursing leadership, and other members of the IDT team.    Consultants/Specialty  general surgery (Dr. Osborne, Dr. Shultz)    Code Status  Full Code    Disposition  Patient is not medically cleared for discharge.   Anticipate discharge to  D  .  I have placed the appropriate orders for post-discharge needs.    Review of Systems  Review of Systems   Constitutional:  Negative for chills and fever.   HENT:  Negative for congestion and hearing loss.    Eyes:  Negative for blurred vision and pain.   Respiratory:  Negative for cough and shortness of breath.    Cardiovascular:  Negative for chest pain and palpitations.   Gastrointestinal:  Positive for abdominal pain. Negative for nausea and vomiting.   Genitourinary:  Negative for dysuria and hematuria.   Musculoskeletal:  Negative for back pain and joint pain.   Skin:  Negative for itching and rash.   Neurological:  Negative for dizziness and headaches.   Psychiatric/Behavioral:  Positive for memory loss. The patient is nervous/anxious. The patient does not have insomnia.    All other systems reviewed and are negative.     Physical Exam  Temp:  [36.5 °C (97.7 °F)-37 °C (98.6 °F)] 37 °C (98.6 °F)  Pulse:  [62-72] 62  Resp:  [17-18] 17  BP: (113-137)/(67-73) 113/67  SpO2:  [91 %-98 %] 98 %    Physical Exam  Vitals and nursing note reviewed.   Constitutional:       General: He is not in acute distress.     Appearance: He is not diaphoretic.   HENT:      Mouth/Throat:      Mouth: Mucous membranes are dry.      Pharynx: No oropharyngeal exudate.   Cardiovascular:      Rate and Rhythm: Normal rate and regular rhythm.      Pulses: Normal pulses.      Heart sounds: Normal heart sounds. No murmur heard.  Pulmonary:      Effort: Pulmonary effort is normal. No respiratory distress.      Breath sounds: Normal breath sounds. No wheezing or rales.   Abdominal:      General: There is distension.      Tenderness: There is abdominal tenderness.      Comments: Hypoactive bowel sounds   Musculoskeletal:         General: No swelling or tenderness. Normal range of motion.   Skin:     General: Skin is warm.      Capillary Refill: Capillary refill takes less than 2 seconds.      Coloration: Skin is not jaundiced or pale.    Neurological:      Mental Status: He is alert and oriented to person, place, and time. Mental status is at baseline.      Motor: No weakness.      Comments: Mild confusion   Psychiatric:      Comments: + anxiety, poor insight       Fluids    Intake/Output Summary (Last 24 hours) at 11/22/2022 1541  Last data filed at 11/22/2022 0940  Gross per 24 hour   Intake 0 ml   Output 1850 ml   Net -1850 ml       Laboratory  Recent Labs     11/20/22 0220 11/21/22 0255 11/22/22  0131   WBC 4.1* 3.4* 7.0   RBC 4.39* 4.23* 4.00*   HEMOGLOBIN 14.7 13.8* 13.9*   HEMATOCRIT 42.8 41.6* 39.8*   MCV 97.5 98.3* 99.5*   MCH 33.5* 32.6 34.8*   MCHC 34.3 33.2* 34.9   RDW 46.1 46.2 47.6   PLATELETCT 192 206 223   MPV 10.4 10.0 10.6     Recent Labs     11/20/22 0220 11/21/22 0255 11/22/22  0131   SODIUM 144 148* 147*   POTASSIUM 3.9 3.5* 3.8   CHLORIDE 108 110 113*   CO2 25 26 22   GLUCOSE 124* 111* 88   BUN 34* 32* 29*   CREATININE 0.96 0.88 0.81   CALCIUM 8.7 8.2* 8.2*                   Imaging  DX-ABDOMEN FOR TUBE PLACEMENT   Final Result      NG tube terminates over the stomach      Similar gaseous distention of small bowel loops concerning for distal obstruction      DX-ABDOMEN FOR TUBE PLACEMENT   Final Result      NG tube has been advanced and now terminates over the gastric fundus in good position      DX-ABDOMEN FOR TUBE PLACEMENT   Final Result      NG tube terminates abnormally high, over the distal esophagus and there is continued gaseous distention of bowel loops      Voalte sent to and replied by HERNANDO RAMOS on 11/21/2022 1:36 PM.      DX-SMALL BOWEL SERIES   Final Result      1.  Difficult to definitively identify contrast within the colon 24 hour images due to dilution of contrast and suction of contrast from stomach.      2.  There is persistent dilated small bowel suggesting small bowel obstruction. Consider CT scan for further evaluation.      DX-ABDOMEN FOR TUBE PLACEMENT   Final Result      Enteric tube has been  placed and the tip projects over the stomach.      [id number] VIEW   Final Result      1.  Dilated loops of small bowel consistent with small bowel obstruction.           Assessment/Plan  * SBO (small bowel obstruction) (HCC)- (present on admission)  Assessment & Plan  - Patient does have a partial small bowel obstruction at the anastomosis from his previous hemicolectomy  -Patient currently is no longer vomiting, did refuse an NG at the outside facility  -Patient reporting no flatus and that his pain feels like a bubble that needs to pop, LLQ tenderness.  -Keep patient n.p.o.  -IV narcotics as needed  -General surgery consulting - discussed with Dr Osborne/Carrington  - SBFT completed, read pending.  - NG tube placed and family at bedside making sure patient leaves the tube in his nose. No current surgical plans unless he fails decompression with NG tube.  IF surgery does prove necessary, NG tube will be in place post operative 3-5 days minimum.    11/22- Patient pulled NGT overnight.  He has not passed stool or gas. Has abdominal pain, and feels very anxious.  Met with wife and daughter, they drive from [facility].  They agreed to retry another NGT.  If needed, for patient's safety and to continue treatment, they agreed to restraints with bilateral soft wrists. Wife wished to be notified if restraints are needed.    - changed IVF to D5W1/2NS    Leukocytosis- (present on admission)  Assessment & Plan  - Likely reactive, no additional signs or symptoms to suggest bacterial infection  -No antibiotics needed at this time  -Obtain CBC    Dyslipidemia- (present on admission)  Assessment & Plan  - Restart home statin when able    Anxiety- (present on admission)  Assessment & Plan  - Is on Klonopin twice a day, patient is unsure whether that is as needed or scheduled  -Start as needed Ativan  -Restart home Klonopin when able  -I ordered Zyprexa oral dissolving tablet for night time, pending ECG.    Seizure disorder (HCC)-  (present on admission)  Assessment & Plan  - Patient does take oral Depakote, national shortage of IV valproate, will start IV keppra then resume depakote when taking PO.       VTE prophylaxis: SCDs/TEDs and pharmacologic prophylaxis contraindicated due to patient pulls out NGT, 4th episode, high risk for esophageal or nasal hemorrhage.    I have performed a physical exam and reviewed and updated ROS and Plan today (11/22/2022). In review of yesterday's note (11/21/2022), there are no changes except as documented above.

## 2022-11-22 NOTE — PROGRESS NOTES
Received pt's report from Day RN. Assumed care of pt at 1915. Pt is sleeping, easily wakes up by voice, alert, on 1 lpm via NC on R nare. Family at bedside. Pt re-oriented with NGT placement, draining bilious drainage in canister. Discussed updated plan of care including the importance and benefits of the tube. Pt denies pain or N/V at this time. Safety precaution in place, call light within reach. Continue to monitor.    2050: When returning to check the pt in the room, RN found the pt pulled his NGT out. Pt states that he didn't like it in him and too many tubes connected. RN explained to him the importance of the tube but pt refused to put a new NGT. 350 ml bilious output collected. MD Franklin notified.

## 2022-11-22 NOTE — CARE PLAN
The patient is Stable - Low risk of patient condition declining or worsening    Shift Goals  Clinical Goals: replace ng by end of shift  Patient Goals: shower today  Family Goals: PT's NGT remain in place.    Progress made toward(s) clinical / shift goals:  plan to replace ng tube after patient takes his afternoon nap    Patient is not progressing towards the following goals:pt able to shower today with CNA      Problem: Pain - Standard  Goal: Alleviation of pain or a reduction in pain to the patient’s comfort goal  Outcome: Not Progressing   Pt reports pain with ng tube, will try prn meds to prevent him from pulling it again.

## 2022-11-22 NOTE — THERAPY
"Occupational Therapy   Initial Evaluation     Patient Name: Hernán Walters  Age:  77 y.o., Sex:  male  Medical Record #: [id number]  Today's Date: 11/21/2022     Precautions  Precautions: Fall Risk  Comments: NG tube    Assessment  Patient is 77 y.o. male with a diagnosis of small bowel obstruction. Hx of extensive dental surgery ~7 days ago. Hx of appy, hemicolectomy 4 yrs ago. NG tube in place. A7Ox2-3, mild confusion noted. Shows generalized weakness. Decreased activity tolerance, balance during ADL's. Motivated for walk in halls; tolerates ~200', FWW, with one 10\" seated rest. Erwin with grooming,LB dressing. Lives with spouse in [place], dtr close by. OT will follow while in hospital.            Plan  Recommend Occupational Therapy 3 times per week until therapy goals are met for the following treatments:  Neuro Re-Education / Balance, Self Care/Activities of Daily Living, and Therapeutic Activities.    DC Equipment Recommendations: Unable to determine at this time  Discharge Recommendations: Anticipate that the patient will have no further occupational therapy needs after discharge from the hospital (vs )     Subjective  Agreeable to activity     Objective     11/21/22 1555   Prior Living Situation   Prior Services None   Housing / Facility 2 Story House   Bathroom Set up Shower Chair;Walk In Shower   Equipment Owned Tub / Shower Seat;Single Point Cane   Lives with - Patient's Self Care Capacity Spouse   Comments Pt and wife live in [place], [place]. Dtr lives close by and available to assist if needed.   Prior Level of ADL Function   Self Feeding Independent   Grooming / Hygiene Independent   Bathing Independent   Dressing Independent   Toileting Independent   Prior Level of IADL Function   Medication Management Independent   Laundry Requires Assist   Kitchen Mobility Independent   Finances Unable To Determine At This Time   Home Management Independent   Shopping Independent   Prior Level Of Mobility " Independent Without Device in Home   Driving / Transportation Driving Independent   Occupation (Pre-Hospital Vocational) Retired Due To Age   Leisure Interests Hobbies;Family   History of Falls   History of Falls No   Precautions   Precautions Fall Risk   Comments NG tube   Vitals   O2 Delivery Device None - Room Air   Cognition    Cognition / Consciousness   (Ox2,)   Level of Consciousness Alert   Safety Awareness Impulsive   Comments mild confusion noted.   Passive ROM Upper Body   Passive ROM Upper Body WDL   Active ROM Upper Body   Active ROM Upper Body  WDL   Strength Upper Body   Upper Body Strength  X   Comments generalized weakness   Sensation Upper Body   Upper Extremity Sensation  WDL   Upper Body Muscle Tone   Upper Body Muscle Tone  WDL   Coordination Upper Body   Coordination WDL   Balance Assessment   Sitting Balance (Static) Good   Sitting Balance (Dynamic) Fair +   Standing Balance (Static) Fair   Standing Balance (Dynamic) Fair -   Weight Shift Sitting Good   Weight Shift Standing Fair   Comments fww   ADL Assessment   Grooming Contact Guard Assist;Seated   Upper Body Dressing Minimal Assist   Lower Body Dressing Minimal Assist   How much help from another person does the patient currently need...   Putting on and taking off regular lower body clothing? 3   Bathing (including washing, rinsing, and drying)? 3   Toileting, which includes using a toilet, bedpan, or urinal? 3   Putting on and taking off regular upper body clothing? 3   Taking care of personal grooming such as brushing teeth? 3   Eating meals? 3   6 Clicks Daily Activity Score 18   Functional Mobility   Sit to Stand Contact Guard Assist   Bed, Chair, Wheelchair Transfer Contact Guard Assist   Transfer Method Stand Step   Distance (Feet) 100   # of Times Distance was Traveled 2   Comments fww, O2 @2L NC   Visual Perception   Visual Perception  WDL   Activity Tolerance   Sitting in Chair 15   Sitting Edge of Bed 10   Standing 10   Short  Term Goals   Short Term Goal # 1 pt will perform FB dressing wiht SBA within 5 days   Short Term Goal # 2 pt will perform grooming at sink with SBA within 5 days   Short Term Goal # 3 pt will perform ADL transfers with Spv/SBA within 5 days   Education Group   Education Provided Activities of Daily Living   Role of Occupational Therapist Patient Response Patient;Family;Acceptance;Explanation;Verbal Demonstration   ADL Patient Response Patient;Acceptance;Explanation;Action Demonstration   Problem List   Problem List Decreased Active Daily Living Skills;Decreased Homemaking Skills;Decreased Functional Mobility;Decreased Activity Tolerance;Decreased Upper Extremity Strength Right;Decreased Upper Extremity Strength Left;Impaired Postural Control / Balance   Anticipated Discharge Equipment and Recommendations   DC Equipment Recommendations Unable to determine at this time   Discharge Recommendations Anticipate that the patient will have no further occupational therapy needs after discharge from the hospital  (vs )   Interdisciplinary Plan of Care Collaboration   IDT Collaboration with  Nursing;Family / Caregiver   Patient Position at End of Therapy In Bed;Bed Alarm On;Family / Friend in Room;Phone within Reach;Tray Table within Reach;Call Light within Reach   Collaboration Comments Results. DC planning

## 2022-11-23 ENCOUNTER — ANESTHESIA (OUTPATIENT)
Dept: SURGERY | Facility: MEDICAL CENTER | Age: 77
DRG: 329 | End: 2022-11-23
Payer: MEDICARE

## 2022-11-23 ENCOUNTER — ANESTHESIA EVENT (OUTPATIENT)
Dept: SURGERY | Facility: MEDICAL CENTER | Age: 77
DRG: 329 | End: 2022-11-23
Payer: MEDICARE

## 2022-11-23 PROBLEM — E87.6 HYPOKALEMIA: Status: ACTIVE | Noted: 2022-11-23

## 2022-11-23 LAB
ALBUMIN SERPL BCP-MCNC: 2.9 G/DL (ref 3.2–4.9)
BUN SERPL-MCNC: 24 MG/DL (ref 8–22)
CALCIUM SERPL-MCNC: 8 MG/DL (ref 8.4–10.2)
CHLORIDE SERPL-SCNC: 114 MMOL/L (ref 96–112)
CO2 SERPL-SCNC: 23 MMOL/L (ref 20–33)
CREAT SERPL-MCNC: 0.81 MG/DL (ref 0.5–1.4)
EKG IMPRESSION: NORMAL
ERYTHROCYTE [DISTWIDTH] IN BLOOD BY AUTOMATED COUNT: 45.1 FL (ref 35.9–50)
GFR SERPLBLD CREATININE-BSD FMLA CKD-EPI: 92 ML/MIN/1.73 M 2
GLUCOSE SERPL-MCNC: 114 MG/DL (ref 65–99)
HCT VFR BLD AUTO: 43.9 % (ref 42–52)
HGB BLD-MCNC: 14.7 G/DL (ref 14–18)
MAGNESIUM SERPL-MCNC: 1.9 MG/DL (ref 1.5–2.5)
MCH RBC QN AUTO: 33 PG (ref 27–33)
MCHC RBC AUTO-ENTMCNC: 33.5 G/DL (ref 33.7–35.3)
MCV RBC AUTO: 98.4 FL (ref 81.4–97.8)
PHOSPHATE SERPL-MCNC: 2.2 MG/DL (ref 2.5–4.5)
PLATELET # BLD AUTO: 234 K/UL (ref 164–446)
PMV BLD AUTO: 9.9 FL (ref 9–12.9)
POTASSIUM SERPL-SCNC: 3.2 MMOL/L (ref 3.6–5.5)
RBC # BLD AUTO: 4.46 M/UL (ref 4.7–6.1)
SODIUM SERPL-SCNC: 148 MMOL/L (ref 135–145)
WBC # BLD AUTO: 5.6 K/UL (ref 4.8–10.8)

## 2022-11-23 PROCEDURE — 770006 HCHG ROOM/CARE - MED/SURG/GYN SEMI*

## 2022-11-23 PROCEDURE — 64488 TAP BLOCK BI INJECTION: CPT | Performed by: SURGERY

## 2022-11-23 PROCEDURE — 00790 ANES IPER UPR ABD NOS: CPT | Performed by: INTERNAL MEDICINE

## 2022-11-23 PROCEDURE — 93010 ELECTROCARDIOGRAM REPORT: CPT | Performed by: INTERNAL MEDICINE

## 2022-11-23 PROCEDURE — 0DN80ZZ RELEASE SMALL INTESTINE, OPEN APPROACH: ICD-10-PCS | Performed by: SURGERY

## 2022-11-23 PROCEDURE — 64488 TAP BLOCK BI INJECTION: CPT | Mod: 59 | Performed by: INTERNAL MEDICINE

## 2022-11-23 PROCEDURE — 0DT80ZZ RESECTION OF SMALL INTESTINE, OPEN APPROACH: ICD-10-PCS | Performed by: SURGERY

## 2022-11-23 PROCEDURE — 700105 HCHG RX REV CODE 258: Performed by: INTERNAL MEDICINE

## 2022-11-23 PROCEDURE — 160002 HCHG RECOVERY MINUTES (STAT): Performed by: SURGERY

## 2022-11-23 PROCEDURE — A9270 NON-COVERED ITEM OR SERVICE: HCPCS | Performed by: INTERNAL MEDICINE

## 2022-11-23 PROCEDURE — 160031 HCHG SURGERY MINUTES - 1ST 30 MINS LEVEL 5: Performed by: SURGERY

## 2022-11-23 PROCEDURE — C1725 CATH, TRANSLUMIN NON-LASER: HCPCS | Performed by: SURGERY

## 2022-11-23 PROCEDURE — 83735 ASSAY OF MAGNESIUM: CPT

## 2022-11-23 PROCEDURE — 160035 HCHG PACU - 1ST 60 MINS PHASE I: Performed by: SURGERY

## 2022-11-23 PROCEDURE — 160009 HCHG ANES TIME/MIN: Performed by: SURGERY

## 2022-11-23 PROCEDURE — 700101 HCHG RX REV CODE 250: Performed by: SURGERY

## 2022-11-23 PROCEDURE — 700101 HCHG RX REV CODE 250: Performed by: INTERNAL MEDICINE

## 2022-11-23 PROCEDURE — 700111 HCHG RX REV CODE 636 W/ 250 OVERRIDE (IP): Performed by: INTERNAL MEDICINE

## 2022-11-23 PROCEDURE — 88307 TISSUE EXAM BY PATHOLOGIST: CPT

## 2022-11-23 PROCEDURE — 80069 RENAL FUNCTION PANEL: CPT

## 2022-11-23 PROCEDURE — 700105 HCHG RX REV CODE 258: Performed by: SURGERY

## 2022-11-23 PROCEDURE — 36415 COLL VENOUS BLD VENIPUNCTURE: CPT

## 2022-11-23 PROCEDURE — 700102 HCHG RX REV CODE 250 W/ 637 OVERRIDE(OP): Performed by: INTERNAL MEDICINE

## 2022-11-23 PROCEDURE — 160048 HCHG OR STATISTICAL LEVEL 1-5: Performed by: SURGERY

## 2022-11-23 PROCEDURE — 3E0T3BZ INTRODUCTION OF ANESTHETIC AGENT INTO PERIPHERAL NERVES AND PLEXI, PERCUTANEOUS APPROACH: ICD-10-PCS | Performed by: SURGERY

## 2022-11-23 PROCEDURE — 99140 ANES COMP EMERGENCY COND: CPT | Performed by: INTERNAL MEDICINE

## 2022-11-23 PROCEDURE — 85027 COMPLETE CBC AUTOMATED: CPT

## 2022-11-23 PROCEDURE — 160042 HCHG SURGERY MINUTES - EA ADDL 1 MIN LEVEL 5: Performed by: SURGERY

## 2022-11-23 PROCEDURE — 110371 HCHG SHELL REV 272: Performed by: SURGERY

## 2022-11-23 PROCEDURE — 99100 ANES PT EXTEME AGE<1 YR&>70: CPT | Performed by: INTERNAL MEDICINE

## 2022-11-23 PROCEDURE — 99233 SBSQ HOSP IP/OBS HIGH 50: CPT | Performed by: INTERNAL MEDICINE

## 2022-11-23 PROCEDURE — C1765 ADHESION BARRIER: HCPCS | Performed by: SURGERY

## 2022-11-23 RX ORDER — DIPHENHYDRAMINE HYDROCHLORIDE 50 MG/ML
12.5 INJECTION INTRAMUSCULAR; INTRAVENOUS
Status: DISCONTINUED | OUTPATIENT
Start: 2022-11-23 | End: 2022-11-23 | Stop reason: HOSPADM

## 2022-11-23 RX ORDER — ONDANSETRON 2 MG/ML
4 INJECTION INTRAMUSCULAR; INTRAVENOUS
Status: DISCONTINUED | OUTPATIENT
Start: 2022-11-23 | End: 2022-11-23 | Stop reason: HOSPADM

## 2022-11-23 RX ORDER — LABETALOL HYDROCHLORIDE 5 MG/ML
5 INJECTION, SOLUTION INTRAVENOUS
Status: DISCONTINUED | OUTPATIENT
Start: 2022-11-23 | End: 2022-11-23 | Stop reason: HOSPADM

## 2022-11-23 RX ORDER — HYDROMORPHONE HYDROCHLORIDE 1 MG/ML
0.1 INJECTION, SOLUTION INTRAMUSCULAR; INTRAVENOUS; SUBCUTANEOUS
Status: DISCONTINUED | OUTPATIENT
Start: 2022-11-23 | End: 2022-11-23 | Stop reason: HOSPADM

## 2022-11-23 RX ORDER — ONDANSETRON 2 MG/ML
INJECTION INTRAMUSCULAR; INTRAVENOUS PRN
Status: DISCONTINUED | OUTPATIENT
Start: 2022-11-23 | End: 2022-11-23 | Stop reason: SURG

## 2022-11-23 RX ORDER — POTASSIUM CHLORIDE 7.45 MG/ML
10 INJECTION INTRAVENOUS
Status: COMPLETED | OUTPATIENT
Start: 2022-11-23 | End: 2022-11-23

## 2022-11-23 RX ORDER — BUPIVACAINE HYDROCHLORIDE 5 MG/ML
INJECTION, SOLUTION EPIDURAL; INTRACAUDAL
Status: COMPLETED | OUTPATIENT
Start: 2022-11-23 | End: 2022-11-23

## 2022-11-23 RX ORDER — ROCURONIUM BROMIDE 10 MG/ML
INJECTION, SOLUTION INTRAVENOUS PRN
Status: DISCONTINUED | OUTPATIENT
Start: 2022-11-23 | End: 2022-11-23 | Stop reason: SURG

## 2022-11-23 RX ORDER — OLANZAPINE 5 MG/1
5 TABLET, ORALLY DISINTEGRATING ORAL EVERY EVENING
Status: DISCONTINUED | OUTPATIENT
Start: 2022-11-23 | End: 2022-11-26

## 2022-11-23 RX ORDER — BUPIVACAINE HYDROCHLORIDE AND EPINEPHRINE 5; 5 MG/ML; UG/ML
INJECTION, SOLUTION EPIDURAL; INTRACAUDAL; PERINEURAL
Status: DISCONTINUED | OUTPATIENT
Start: 2022-11-23 | End: 2022-11-23 | Stop reason: HOSPADM

## 2022-11-23 RX ORDER — OXYCODONE HCL 5 MG/5 ML
10 SOLUTION, ORAL ORAL
Status: DISCONTINUED | OUTPATIENT
Start: 2022-11-23 | End: 2022-11-23 | Stop reason: HOSPADM

## 2022-11-23 RX ORDER — SODIUM CHLORIDE, SODIUM LACTATE, POTASSIUM CHLORIDE, CALCIUM CHLORIDE 600; 310; 30; 20 MG/100ML; MG/100ML; MG/100ML; MG/100ML
INJECTION, SOLUTION INTRAVENOUS CONTINUOUS
Status: ACTIVE | OUTPATIENT
Start: 2022-11-23 | End: 2022-11-23

## 2022-11-23 RX ORDER — PHENYLEPHRINE HYDROCHLORIDE 10 MG/ML
INJECTION, SOLUTION INTRAMUSCULAR; INTRAVENOUS; SUBCUTANEOUS PRN
Status: DISCONTINUED | OUTPATIENT
Start: 2022-11-23 | End: 2022-11-23 | Stop reason: SURG

## 2022-11-23 RX ORDER — HYDROMORPHONE HYDROCHLORIDE 1 MG/ML
0.4 INJECTION, SOLUTION INTRAMUSCULAR; INTRAVENOUS; SUBCUTANEOUS
Status: DISCONTINUED | OUTPATIENT
Start: 2022-11-23 | End: 2022-11-23 | Stop reason: HOSPADM

## 2022-11-23 RX ORDER — HYDRALAZINE HYDROCHLORIDE 20 MG/ML
5 INJECTION INTRAMUSCULAR; INTRAVENOUS
Status: DISCONTINUED | OUTPATIENT
Start: 2022-11-23 | End: 2022-11-23 | Stop reason: HOSPADM

## 2022-11-23 RX ORDER — CEFAZOLIN SODIUM 1 G/3ML
INJECTION, POWDER, FOR SOLUTION INTRAMUSCULAR; INTRAVENOUS PRN
Status: DISCONTINUED | OUTPATIENT
Start: 2022-11-23 | End: 2022-11-23 | Stop reason: SURG

## 2022-11-23 RX ORDER — OXYCODONE HCL 5 MG/5 ML
5 SOLUTION, ORAL ORAL
Status: DISCONTINUED | OUTPATIENT
Start: 2022-11-23 | End: 2022-11-23 | Stop reason: HOSPADM

## 2022-11-23 RX ORDER — HALOPERIDOL 5 MG/ML
1 INJECTION INTRAMUSCULAR
Status: DISCONTINUED | OUTPATIENT
Start: 2022-11-23 | End: 2022-11-23 | Stop reason: HOSPADM

## 2022-11-23 RX ORDER — HYDROMORPHONE HYDROCHLORIDE 2 MG/ML
INJECTION, SOLUTION INTRAMUSCULAR; INTRAVENOUS; SUBCUTANEOUS PRN
Status: DISCONTINUED | OUTPATIENT
Start: 2022-11-23 | End: 2022-11-23 | Stop reason: SURG

## 2022-11-23 RX ORDER — DEXAMETHASONE SODIUM PHOSPHATE 4 MG/ML
INJECTION, SOLUTION INTRA-ARTICULAR; INTRALESIONAL; INTRAMUSCULAR; INTRAVENOUS; SOFT TISSUE PRN
Status: DISCONTINUED | OUTPATIENT
Start: 2022-11-23 | End: 2022-11-23 | Stop reason: SURG

## 2022-11-23 RX ORDER — KETOROLAC TROMETHAMINE 30 MG/ML
INJECTION, SOLUTION INTRAMUSCULAR; INTRAVENOUS PRN
Status: DISCONTINUED | OUTPATIENT
Start: 2022-11-23 | End: 2022-11-23 | Stop reason: SURG

## 2022-11-23 RX ORDER — HYDROMORPHONE HYDROCHLORIDE 1 MG/ML
0.2 INJECTION, SOLUTION INTRAMUSCULAR; INTRAVENOUS; SUBCUTANEOUS
Status: DISCONTINUED | OUTPATIENT
Start: 2022-11-23 | End: 2022-11-23 | Stop reason: HOSPADM

## 2022-11-23 RX ORDER — LIDOCAINE HYDROCHLORIDE 20 MG/ML
INJECTION, SOLUTION EPIDURAL; INFILTRATION; INTRACAUDAL; PERINEURAL PRN
Status: DISCONTINUED | OUTPATIENT
Start: 2022-11-23 | End: 2022-11-23 | Stop reason: SURG

## 2022-11-23 RX ORDER — MEPERIDINE HYDROCHLORIDE 25 MG/ML
12.5 INJECTION INTRAMUSCULAR; INTRAVENOUS; SUBCUTANEOUS
Status: DISCONTINUED | OUTPATIENT
Start: 2022-11-23 | End: 2022-11-23 | Stop reason: HOSPADM

## 2022-11-23 RX ADMIN — ROCURONIUM BROMIDE 30 MG: 10 INJECTION, SOLUTION INTRAVENOUS at 16:40

## 2022-11-23 RX ADMIN — Medication 80 MG: at 16:31

## 2022-11-23 RX ADMIN — SODIUM CHLORIDE, POTASSIUM CHLORIDE, SODIUM LACTATE AND CALCIUM CHLORIDE: 600; 310; 30; 20 INJECTION, SOLUTION INTRAVENOUS at 18:06

## 2022-11-23 RX ADMIN — DEXAMETHASONE SODIUM PHOSPHATE 8 MG: 4 INJECTION, SOLUTION INTRA-ARTICULAR; INTRALESIONAL; INTRAMUSCULAR; INTRAVENOUS; SOFT TISSUE at 16:31

## 2022-11-23 RX ADMIN — LEVETIRACETAM 500 MG: 100 INJECTION, SOLUTION INTRAVENOUS at 08:17

## 2022-11-23 RX ADMIN — POTASSIUM CHLORIDE 10 MEQ: 7.46 INJECTION, SOLUTION INTRAVENOUS at 10:33

## 2022-11-23 RX ADMIN — AMPICILLIN AND SULBACTAM 3 G: 1; 2 INJECTION, POWDER, FOR SOLUTION INTRAMUSCULAR; INTRAVENOUS at 06:03

## 2022-11-23 RX ADMIN — LIDOCAINE HYDROCHLORIDE 60 MG: 20 INJECTION, SOLUTION EPIDURAL; INFILTRATION; INTRACAUDAL at 16:31

## 2022-11-23 RX ADMIN — ROCURONIUM BROMIDE 10 MG: 10 INJECTION, SOLUTION INTRAVENOUS at 17:24

## 2022-11-23 RX ADMIN — PROPOFOL 120 MG: 10 INJECTION, EMULSION INTRAVENOUS at 16:31

## 2022-11-23 RX ADMIN — POTASSIUM CHLORIDE 10 MEQ: 7.46 INJECTION, SOLUTION INTRAVENOUS at 09:26

## 2022-11-23 RX ADMIN — HYDROMORPHONE HYDROCHLORIDE 0.5 MG: 2 INJECTION INTRAMUSCULAR; INTRAVENOUS; SUBCUTANEOUS at 16:58

## 2022-11-23 RX ADMIN — PHENYLEPHRINE HYDROCHLORIDE 200 MCG: 10 INJECTION INTRAVENOUS at 17:49

## 2022-11-23 RX ADMIN — ONDANSETRON 4 MG: 2 INJECTION INTRAMUSCULAR; INTRAVENOUS at 18:00

## 2022-11-23 RX ADMIN — ROCURONIUM BROMIDE 10 MG: 10 INJECTION, SOLUTION INTRAVENOUS at 17:43

## 2022-11-23 RX ADMIN — SUGAMMADEX 200 MG: 100 INJECTION, SOLUTION INTRAVENOUS at 18:07

## 2022-11-23 RX ADMIN — HYDROMORPHONE HYDROCHLORIDE 0.5 MG: 2 INJECTION INTRAMUSCULAR; INTRAVENOUS; SUBCUTANEOUS at 16:31

## 2022-11-23 RX ADMIN — OLANZAPINE 5 MG: 5 TABLET, ORALLY DISINTEGRATING ORAL at 20:22

## 2022-11-23 RX ADMIN — ROCURONIUM BROMIDE 10 MG: 10 INJECTION, SOLUTION INTRAVENOUS at 17:05

## 2022-11-23 RX ADMIN — SODIUM CHLORIDE, POTASSIUM CHLORIDE, SODIUM LACTATE AND CALCIUM CHLORIDE: 600; 310; 30; 20 INJECTION, SOLUTION INTRAVENOUS at 16:26

## 2022-11-23 RX ADMIN — POTASSIUM CHLORIDE 10 MEQ: 7.46 INJECTION, SOLUTION INTRAVENOUS at 08:23

## 2022-11-23 RX ADMIN — HYDROMORPHONE HYDROCHLORIDE 0.5 MG: 2 INJECTION INTRAMUSCULAR; INTRAVENOUS; SUBCUTANEOUS at 18:05

## 2022-11-23 RX ADMIN — KETOROLAC TROMETHAMINE 30 MG: 30 INJECTION, SOLUTION INTRAMUSCULAR at 18:00

## 2022-11-23 RX ADMIN — PHENYLEPHRINE HYDROCHLORIDE 100 MCG: 10 INJECTION INTRAVENOUS at 17:54

## 2022-11-23 RX ADMIN — LEVETIRACETAM 500 MG: 100 INJECTION, SOLUTION INTRAVENOUS at 20:22

## 2022-11-23 RX ADMIN — CEFAZOLIN 2 G: 330 INJECTION, POWDER, FOR SOLUTION INTRAMUSCULAR; INTRAVENOUS at 16:35

## 2022-11-23 RX ADMIN — BUPIVACAINE HYDROCHLORIDE 30 ML: 5 INJECTION, SOLUTION EPIDURAL; INTRACAUDAL; PERINEURAL at 18:05

## 2022-11-23 RX ADMIN — DEXTROSE AND SODIUM CHLORIDE: 5; 450 INJECTION, SOLUTION INTRAVENOUS at 06:48

## 2022-11-23 RX ADMIN — PHENYLEPHRINE HYDROCHLORIDE 200 MCG: 10 INJECTION INTRAVENOUS at 17:57

## 2022-11-23 ASSESSMENT — ENCOUNTER SYMPTOMS
BLURRED VISION: 0
HEADACHES: 0
COUGH: 0
ABDOMINAL PAIN: 1
MEMORY LOSS: 1
NERVOUS/ANXIOUS: 0
SHORTNESS OF BREATH: 0
FEVER: 0
DIZZINESS: 0
INSOMNIA: 0
VOMITING: 0
EYE PAIN: 0
NAUSEA: 0

## 2022-11-23 ASSESSMENT — PAIN DESCRIPTION - PAIN TYPE
TYPE: ACUTE PAIN
TYPE: ACUTE PAIN

## 2022-11-23 ASSESSMENT — FIBROSIS 4 INDEX: FIB4 SCORE: 1.88

## 2022-11-23 NOTE — DISCHARGE PLANNING
Case Management Discharge Planning    Admission Date: 11/18/2022  GMLOS: 2.3  ALOS: 5    6-Clicks ADL Score: 18  6-Clicks Mobility Score: 19      Anticipated Discharge Dispo: Discharge Disposition: Discharged to home/self care (01)    DME Needed: No    Action(s) Taken: Updated Provider/Nurse on Discharge Plan    Anticipate patient will do outpatient PT/OT in [place], [place] as there is no  agency that goes out to this area. Family and patient aware.     Escalations Completed: None    Medically Clear: No    Next Steps: Medical clearance    Barriers to Discharge: Medical clearance

## 2022-11-23 NOTE — PROGRESS NOTES
Hospital Medicine Daily Progress Note    Date of Service  11/23/2022    Chief Complaint  Hernán Walters is a 77 y.o. male admitted 11/18/2022 with N/V and abdominal pain    Hospital Course  Hernán Walters is a 77 y.o. male who presented 11/18/2022 with abdominal pain.  Patient states his pain initially started approximately a week ago however has been worsening.  He states its in his entire abdomen, sharp at times, can be severe.  He also states he began having nausea and vomiting and can no longer keep anything down.  Because of the ongoing nature of this and the worsening nature, he presented to the ER at an outlying facility.  There they did imaging and noted a small bowel obstruction.  Patient was transferred here for higher level of care.  Patient noted no bleeding.  He does have a history of appendectomy and hemicolectomy approximately 4 years ago.  Patient states he did have a tooth pulled and has been antibiotics on this for approximately 7 days.  He was transferred from outside ER to here at [facility] for higher level of care.      Interval Problem Update  Patient tolerated NGT overnight. Telesitter at bedside. Patient understands he will need surgery.    Continue Zyprexa oral dissolving tablet for night time  [facility] following, have discussed with patient and family about surgery, tentative surgery planning with Dr. Shultz.    I have discussed this patient's plan of care and discharge plan at IDT rounds today with Case Management, Nursing, Nursing leadership, and other members of the IDT team.    Consultants/Specialty  general surgery (Dr. Osborne, Dr. Shultz)    Code Status  Full Code    Disposition  Patient is not medically cleared for discharge.   Anticipate discharge to  TBD .  I have placed the appropriate orders for post-discharge needs.    Review of Systems  Review of Systems   Constitutional:  Negative for fever and malaise/fatigue.   HENT:  Negative for congestion and hearing loss.    Eyes:   Negative for blurred vision and pain.   Respiratory:  Negative for cough and shortness of breath.    Gastrointestinal:  Positive for abdominal pain. Negative for nausea and vomiting.   Genitourinary:  Negative for dysuria and hematuria.   Neurological:  Negative for dizziness and headaches.   Psychiatric/Behavioral:  Positive for memory loss. The patient is not nervous/anxious and does not have insomnia.    All other systems reviewed and are negative.     Physical Exam  Temp:  [36.9 °C (98.4 °F)-37.7 °C (99.8 °F)] 37.2 °C (99 °F)  Pulse:  [60-72] 72  Resp:  [17-20] 18  BP: (113-129)/(67-81) 129/81  SpO2:  [90 %-98 %] 90 %    Physical Exam  Vitals and nursing note reviewed.   Constitutional:       General: He is not in acute distress.  HENT:      Nose:      Comments: NGT in place, bilious output     Mouth/Throat:      Mouth: Mucous membranes are dry.      Pharynx: No oropharyngeal exudate.   Cardiovascular:      Rate and Rhythm: Normal rate and regular rhythm.      Pulses: Normal pulses.      Heart sounds: Normal heart sounds. No murmur heard.  Pulmonary:      Effort: Pulmonary effort is normal. No respiratory distress.      Breath sounds: Normal breath sounds.   Abdominal:      General: There is distension.      Tenderness: There is no abdominal tenderness.      Comments: Hypoactive bowel sounds   Musculoskeletal:         General: No swelling or tenderness. Normal range of motion.   Skin:     General: Skin is warm.      Capillary Refill: Capillary refill takes less than 2 seconds.      Coloration: Skin is not jaundiced or pale.   Neurological:      Mental Status: He is alert and oriented to person, place, and time. Mental status is at baseline.      Motor: No weakness.      Comments: Mild confusion   Psychiatric:         Mood and Affect: Mood normal.         Behavior: Behavior normal.         Thought Content: Thought content normal.         Judgment: Judgment normal.       Fluids    Intake/Output Summary (Last 24  hours) at 11/23/2022 0749  Last data filed at 11/23/2022 0659  Gross per 24 hour   Intake 1817.8 ml   Output 950 ml   Net 867.8 ml         Laboratory  Recent Labs     11/21/22 0255 11/22/22 0131 11/23/22  0646   WBC 3.4* 7.0 5.6   RBC 4.23* 4.00* 4.46*   HEMOGLOBIN 13.8* 13.9* 14.7   HEMATOCRIT 41.6* 39.8* 43.9   MCV 98.3* 99.5* 98.4*   MCH 32.6 34.8* 33.0   MCHC 33.2* 34.9 33.5*   RDW 46.2 47.6 45.1   PLATELETCT 206 223 234   MPV 10.0 10.6 9.9       Recent Labs     11/21/22 0255 11/22/22  0131 11/23/22  0646   SODIUM 148* 147* 148*   POTASSIUM 3.5* 3.8 3.2*   CHLORIDE 110 113* 114*   CO2 26 22 23   GLUCOSE 111* 88 114*   BUN 32* 29* 24*   CREATININE 0.88 0.81 0.81   CALCIUM 8.2* 8.2* 8.0*                     Imaging  DX-ABDOMEN FOR TUBE PLACEMENT   Final Result      NG tube terminates over the stomach      Similar gaseous distention of small bowel loops concerning for distal obstruction      DX-ABDOMEN FOR TUBE PLACEMENT   Final Result      NG tube has been advanced and now terminates over the gastric fundus in good position      DX-ABDOMEN FOR TUBE PLACEMENT   Final Result      NG tube terminates abnormally high, over the distal esophagus and there is continued gaseous distention of bowel loops      Voalte sent to and replied by HERNANDO RAMOS on 11/21/2022 1:36 PM.      DX-SMALL BOWEL SERIES   Final Result      1.  Difficult to definitively identify contrast within the colon 24 hour images due to dilution of contrast and suction of contrast from stomach.      2.  There is persistent dilated small bowel suggesting small bowel obstruction. Consider CT scan for further evaluation.      DX-ABDOMEN FOR TUBE PLACEMENT   Final Result      Enteric tube has been placed and the tip projects over the stomach.      [id number] VIEW   Final Result      1.  Dilated loops of small bowel consistent with small bowel obstruction.             Assessment/Plan  * SBO (small bowel obstruction) (HCC)- (present on  admission)  Assessment & Plan  - Patient does have a partial small bowel obstruction at the anastomosis from his previous hemicolectomy  -Patient currently is no longer vomiting, did refuse an NG at the outside facility  -Patient reporting no flatus and that his pain feels like a bubble that needs to pop, LLQ tenderness.  -Keep patient n.p.o.  -IV narcotics as needed  -General surgery consulting - discussed with Dr Osborne/Carrington  - SBFT completed, read pending.  - NG tube placed and family at bedside making sure patient leaves the tube in his nose. No current surgical plans unless he fails decompression with NG tube.  IF surgery does prove necessary, NG tube will be in place post operative 3-5 days minimum.    11/22- Patient pulled NGT overnight.  He has not passed stool or gas. Has abdominal pain, and feels very anxious.  Met with wife and daughter, they drive from [facility].  They agreed to retry another NGT.  If needed, for patient's safety and to continue treatment, they agreed to restraints with bilateral soft wrists. Wife wished to be notified if restraints are needed.      11/23 - possible surgery planning by Dr. Shultz as per his note. Patient tolerated NGT overnight.  Continue IVF D5W1/2NS    Hypokalemia  Assessment & Plan  Due to GI condition, suction  Replacing via IV  Recheck lab daily    Leukocytosis- (present on admission)  Assessment & Plan  - Likely reactive, no additional signs or symptoms to suggest bacterial infection  -No antibiotics needed at this time  -Obtain CBC    Dyslipidemia- (present on admission)  Assessment & Plan  - Restart home statin when able    Anxiety- (present on admission)  Assessment & Plan  - Is on Klonopin twice a day, patient is unsure whether that is as needed or scheduled  -Start as needed Ativan  -Restart home Klonopin when able. Patient is also off of depakote which is helping his mood at home.  -continue Zyprexa oral dissolving tablet for night time, (QTc  464ms)    Seizure disorder (HCC)- (present on admission)  Assessment & Plan  - Patient does take oral Depakote, national shortage of IV valproate, will start IV keppra then resume depakote when taking PO.       VTE prophylaxis: SCDs/TEDs and pharmacologic prophylaxis contraindicated due to patient pulls out NGT, 4th episode, high risk for esophageal or nasal hemorrhage.    I have performed a physical exam and reviewed and updated ROS and Plan today (11/23/2022). In review of yesterday's note (11/22/2022), there are no changes except as documented above.

## 2022-11-23 NOTE — CARE PLAN
The patient is Stable - Low risk of patient condition declining or worsening    Shift Goals  Clinical Goals: Pt will able to manage NGT in place during this shift.  Patient Goals: Pt will able tosleep comfortably, NGT in place.  Family Goals: n/a    Progress made toward(s) clinical / shift goals:  Pt remain his NGT in place overnight with 150 ml bilious drainage in the canister. Pt able to sleep to sleep.    Patient is not progressing towards the following goals:

## 2022-11-23 NOTE — DIETARY
Nutrition Services:  Per surgeon's note, patient keeps removing his NG tube limiting his ability to recover nonoperatively. Plan is to replace NG tube. If he pulls it out again, he may go to OR tomorrow. Pt is NPO x 5 days. If pt is not able to start a diet soon, he may benefit from nutrition support such as parenteral nutrition. RD will continue to follow.

## 2022-11-23 NOTE — PROGRESS NOTES
Received pt's report from Day RN. Assumed care pt at 1915. Pt is sleeping, easily wakes up by voice. Reorient pt NGT in place, educated importance of the tube. NGT draining bilious drainage. Telesitter at bedside. SCD's on. Pt denies any pain. Discussed updated POC. Safety precaution in place. Continue to monitor.

## 2022-11-23 NOTE — PROGRESS NOTES
"       S: Hernán Walters  is a 77 y.o.  male admitted for bowel obstruction. Sunshine NGT this AM, no change in symptoms. Obstipation, constipation remain      O:  /81   Pulse 72   Temp 37.2 °C (99 °F) (Oral)   Resp 18   Ht 1.727 m (5' 8\")   Wt 60.7 kg (133 lb 13.1 oz)   SpO2 90%   Intake/Output                               11/21/22 0700 - 11/22/22 0659 11/22/22 0700 - 11/23/22 0659 11/23/22 0700 - 11/24/22 0659     [id number] 7088-6225 Total 7853-8622 9668-9162 Total 3114-5209 5124-8438 Total                    Intake    P.O.  --  -- --  0  -- 0  --  -- --    P.O. -- -- -- 0 -- 0 -- -- --    I.V.  2423.4  -- 2423.4  --  -- --  --  -- --    Volume (mL) (NS infusion) 2423.4 -- 2423.4 -- -- -- -- -- --    IV Piggyback  200  -- 200  --  -- --  --  -- --    Volume (mL) (ampicillin/sulbactam (UNASYN) 3 g in  mL IVPB) 200 -- 200 -- -- -- -- -- --    Total Intake 2623.4 -- 2623.4 0 -- 0 -- -- --       Output    Urine  200  700 900  150  600 750  --  -- --    Number of Times Voided 1 x 2 x 3 x 1 x -- 1 x -- -- --    Urine Void (mL) 200 700 900 150 600 750 -- -- --    Emesis  300  -- 300  --  -- --  --  -- --    Emesis 300 -- 300 -- -- -- -- -- --    Stool  --  0 0  --  0 0  --  -- --    Number of Times Stooled 0 x 0 x 0 x -- 0 x 0 x -- -- --    Measurable Stool (mL) -- 0 0 -- 0 0 -- -- --    Emesis/NG output  1000  -- 1000  200  -- 200  --  -- --    Output (mL) ([REMOVED] Enteral Tube 11/21/22 Nasogastric 14 Fr. Left nare) 1000 -- 1000 -- -- -- -- -- --    Output (mL) (Enteral Tube 11/22/22 Nasogastric 14 Fr. Right nare) -- -- -- 200 -- 200 -- -- --    Total Output 8650 471 2113 350 600 950 -- -- --       Net I/O     1123.4 -700 423.4 -350 -600 -950 -- -- --          Recent Labs     11/21/22  0255 11/22/22  0131   SODIUM 148* 147*   POTASSIUM 3.5* 3.8   CHLORIDE 110 113*   CO2 26 22   GLUCOSE 111* 88   BUN 32* 29*   CREATININE 0.88 0.81   CALCIUM 8.2* 8.2*     Recent Labs     11/21/22  0255 11/22/22  0131   WBC " 3.4* 7.0   RBC 4.23* 4.00*   HEMOGLOBIN 13.8* 13.9*   HEMATOCRIT 41.6* 39.8*   MCV 98.3* 99.5*   MCH 32.6 34.8*   MCHC 33.2* 34.9   RDW 46.2 47.6   PLATELETCT 206 223   MPV 10.0 10.6       Alert and Oriented x3, No Acute Distress  Normal Respiratory Effort  Abdomen soft, distended  Extremities warm and well perfused    A/P:  Sunshine NGT, Still no change in symptoms. P;an for OR today. Discussed open vs laparoscopic lysis of adhesions. Will attempt laparoscopic but high chance of conversion to open possible need for bowel resection. We discussed the risks, benefits, and alternatives to this extensively. We discussed the chances of more bowel obstructions in the future and the need for NGT decompression after surgery.     Junior Shultz MD  [facility]

## 2022-11-23 NOTE — ANESTHESIA PREPROCEDURE EVALUATION
Case: [id number] Date/Time: 11/23/22 1557    Procedures:       LAPAROSCOPY DIAGNOSTIC      LAPAROTOMY, EXPLORATORY POSS    Location:  OR 01 / SURGERY [facility]    Surgeons: Junior Shultz M.D.          Relevant Problems   NEURO   (positive) Seizure disorder (HCC)      Other   (positive) Anxiety   (positive) SBO (small bowel obstruction) (HCC)       Physical Exam    Airway   Mallampati: II  TM distance: >3 FB  Neck ROM: full       Cardiovascular - normal exam  Rhythm: regular  Rate: normal  (-) murmur     Dental - normal exam           Pulmonary - normal exam  Breath sounds clear to auscultation     Abdominal    Neurological - normal exam                 Anesthesia Plan    ASA 2- EMERGENT   ASA physical status emergent criteria: compromised vital organ, limb or tissue    Plan - general       Airway plan will be ETT          Induction: intravenous    Postoperative Plan: Postoperative administration of opioids is intended.    Pertinent diagnostic labs and testing reviewed    Informed Consent:    Anesthetic plan and risks discussed with patient.    Use of blood products discussed with: patient whom consented to blood products.

## 2022-11-24 PROBLEM — E83.42 HYPOMAGNESEMIA: Status: ACTIVE | Noted: 2022-11-24

## 2022-11-24 LAB
ALBUMIN SERPL BCP-MCNC: 2.6 G/DL (ref 3.2–4.9)
ALBUMIN/GLOB SERPL: 1.2 G/DL
ALP SERPL-CCNC: 42 U/L (ref 30–99)
ALT SERPL-CCNC: 17 U/L (ref 2–50)
ANION GAP SERPL CALC-SCNC: 13 MMOL/L (ref 7–16)
APTT PPP: 21.8 SEC (ref 24.7–36)
AST SERPL-CCNC: 19 U/L (ref 12–45)
BILIRUB SERPL-MCNC: 0.8 MG/DL (ref 0.1–1.5)
BUN SERPL-MCNC: 27 MG/DL (ref 8–22)
CALCIUM SERPL-MCNC: 7.6 MG/DL (ref 8.4–10.2)
CHLORIDE SERPL-SCNC: 111 MMOL/L (ref 96–112)
CO2 SERPL-SCNC: 23 MMOL/L (ref 20–33)
CREAT SERPL-MCNC: 1.37 MG/DL (ref 0.5–1.4)
ERYTHROCYTE [DISTWIDTH] IN BLOOD BY AUTOMATED COUNT: 45.3 FL (ref 35.9–50)
GFR SERPLBLD CREATININE-BSD FMLA CKD-EPI: 54 ML/MIN/1.73 M 2
GLOBULIN SER CALC-MCNC: 2.2 G/DL (ref 1.9–3.5)
GLUCOSE SERPL-MCNC: 136 MG/DL (ref 65–99)
HCT VFR BLD AUTO: 43.3 % (ref 42–52)
HGB BLD-MCNC: 14.5 G/DL (ref 14–18)
INR PPP: 1.21 (ref 0.87–1.13)
MAGNESIUM SERPL-MCNC: 1.8 MG/DL (ref 1.5–2.5)
MCH RBC QN AUTO: 32.7 PG (ref 27–33)
MCHC RBC AUTO-ENTMCNC: 33.5 G/DL (ref 33.7–35.3)
MCV RBC AUTO: 97.5 FL (ref 81.4–97.8)
PHOSPHATE SERPL-MCNC: 4.6 MG/DL (ref 2.5–4.5)
PLATELET # BLD AUTO: 245 K/UL (ref 164–446)
PMV BLD AUTO: 9.8 FL (ref 9–12.9)
POTASSIUM SERPL-SCNC: 4.3 MMOL/L (ref 3.6–5.5)
PROT SERPL-MCNC: 4.8 G/DL (ref 6–8.2)
PROTHROMBIN TIME: 14.8 SEC (ref 12–14.6)
RBC # BLD AUTO: 4.44 M/UL (ref 4.7–6.1)
SODIUM SERPL-SCNC: 147 MMOL/L (ref 135–145)
WBC # BLD AUTO: 14.8 K/UL (ref 4.8–10.8)

## 2022-11-24 PROCEDURE — 84100 ASSAY OF PHOSPHORUS: CPT

## 2022-11-24 PROCEDURE — 700102 HCHG RX REV CODE 250 W/ 637 OVERRIDE(OP): Performed by: INTERNAL MEDICINE

## 2022-11-24 PROCEDURE — 700105 HCHG RX REV CODE 258: Performed by: INTERNAL MEDICINE

## 2022-11-24 PROCEDURE — 36415 COLL VENOUS BLD VENIPUNCTURE: CPT

## 2022-11-24 PROCEDURE — 51798 US URINE CAPACITY MEASURE: CPT

## 2022-11-24 PROCEDURE — 99356 PR PROLONGED SVC I/P OR OBS SETTING 1ST HOUR: CPT | Performed by: INTERNAL MEDICINE

## 2022-11-24 PROCEDURE — 700111 HCHG RX REV CODE 636 W/ 250 OVERRIDE (IP): Performed by: INTERNAL MEDICINE

## 2022-11-24 PROCEDURE — A9270 NON-COVERED ITEM OR SERVICE: HCPCS | Performed by: INTERNAL MEDICINE

## 2022-11-24 PROCEDURE — 770006 HCHG ROOM/CARE - MED/SURG/GYN SEMI*

## 2022-11-24 PROCEDURE — 85027 COMPLETE CBC AUTOMATED: CPT

## 2022-11-24 PROCEDURE — 85610 PROTHROMBIN TIME: CPT

## 2022-11-24 PROCEDURE — 94760 N-INVAS EAR/PLS OXIMETRY 1: CPT

## 2022-11-24 PROCEDURE — 85730 THROMBOPLASTIN TIME PARTIAL: CPT

## 2022-11-24 PROCEDURE — 80053 COMPREHEN METABOLIC PANEL: CPT

## 2022-11-24 PROCEDURE — 99233 SBSQ HOSP IP/OBS HIGH 50: CPT | Performed by: INTERNAL MEDICINE

## 2022-11-24 PROCEDURE — 83735 ASSAY OF MAGNESIUM: CPT

## 2022-11-24 PROCEDURE — 700101 HCHG RX REV CODE 250: Performed by: INTERNAL MEDICINE

## 2022-11-24 RX ORDER — MAGNESIUM SULFATE 1 G/100ML
1 INJECTION INTRAVENOUS ONCE
Status: COMPLETED | OUTPATIENT
Start: 2022-11-24 | End: 2022-11-24

## 2022-11-24 RX ORDER — METRONIDAZOLE 500 MG/100ML
500 INJECTION, SOLUTION INTRAVENOUS EVERY 8 HOURS
Status: DISCONTINUED | OUTPATIENT
Start: 2022-11-24 | End: 2022-11-27

## 2022-11-24 RX ADMIN — DEXTROSE AND SODIUM CHLORIDE 1000 ML: 5; 450 INJECTION, SOLUTION INTRAVENOUS at 08:27

## 2022-11-24 RX ADMIN — METRONIDAZOLE 500 MG: 500 INJECTION, SOLUTION INTRAVENOUS at 14:38

## 2022-11-24 RX ADMIN — LEVETIRACETAM 500 MG: 100 INJECTION, SOLUTION INTRAVENOUS at 08:30

## 2022-11-24 RX ADMIN — METRONIDAZOLE 500 MG: 500 INJECTION, SOLUTION INTRAVENOUS at 10:30

## 2022-11-24 RX ADMIN — MAGNESIUM SULFATE IN DEXTROSE 1 G: 10 INJECTION, SOLUTION INTRAVENOUS at 09:11

## 2022-11-24 RX ADMIN — METRONIDAZOLE 500 MG: 500 INJECTION, SOLUTION INTRAVENOUS at 20:58

## 2022-11-24 RX ADMIN — HYDROMORPHONE HYDROCHLORIDE 0.5 MG: 1 INJECTION, SOLUTION INTRAMUSCULAR; INTRAVENOUS; SUBCUTANEOUS at 15:23

## 2022-11-24 RX ADMIN — OLANZAPINE 5 MG: 5 TABLET, ORALLY DISINTEGRATING ORAL at 18:05

## 2022-11-24 RX ADMIN — HYDROMORPHONE HYDROCHLORIDE 0.5 MG: 1 INJECTION, SOLUTION INTRAMUSCULAR; INTRAVENOUS; SUBCUTANEOUS at 11:30

## 2022-11-24 RX ADMIN — HYDROMORPHONE HYDROCHLORIDE 0.5 MG: 1 INJECTION, SOLUTION INTRAMUSCULAR; INTRAVENOUS; SUBCUTANEOUS at 07:04

## 2022-11-24 RX ADMIN — CEFTRIAXONE SODIUM 2000 MG: 2 INJECTION, POWDER, FOR SOLUTION INTRAMUSCULAR; INTRAVENOUS at 08:29

## 2022-11-24 RX ADMIN — HYDROMORPHONE HYDROCHLORIDE 0.5 MG: 1 INJECTION, SOLUTION INTRAMUSCULAR; INTRAVENOUS; SUBCUTANEOUS at 20:12

## 2022-11-24 RX ADMIN — LEVETIRACETAM 500 MG: 100 INJECTION, SOLUTION INTRAVENOUS at 20:12

## 2022-11-24 ASSESSMENT — ENCOUNTER SYMPTOMS
SHORTNESS OF BREATH: 0
NAUSEA: 0
ABDOMINAL PAIN: 1
BLURRED VISION: 0
EYE PAIN: 0
FEVER: 0
DIZZINESS: 0
COUGH: 0
VOMITING: 0
INSOMNIA: 0
NERVOUS/ANXIOUS: 0
MEMORY LOSS: 0
HEADACHES: 0

## 2022-11-24 ASSESSMENT — COGNITIVE AND FUNCTIONAL STATUS - GENERAL
TOILETING: A LITTLE
CLIMB 3 TO 5 STEPS WITH RAILING: A LOT
DAILY ACTIVITIY SCORE: 17
TURNING FROM BACK TO SIDE WHILE IN FLAT BAD: A LITTLE
MOBILITY SCORE: 18
WALKING IN HOSPITAL ROOM: A LITTLE
STANDING UP FROM CHAIR USING ARMS: A LITTLE
SUGGESTED CMS G CODE MODIFIER MOBILITY: CK
SUGGESTED CMS G CODE MODIFIER DAILY ACTIVITY: CK
DRESSING REGULAR UPPER BODY CLOTHING: A LOT
PERSONAL GROOMING: A LITTLE
HELP NEEDED FOR BATHING: A LOT
MOVING FROM LYING ON BACK TO SITTING ON SIDE OF FLAT BED: A LITTLE
DRESSING REGULAR LOWER BODY CLOTHING: A LITTLE

## 2022-11-24 ASSESSMENT — PAIN DESCRIPTION - PAIN TYPE: TYPE: ACUTE PAIN

## 2022-11-24 NOTE — ANESTHESIA POSTPROCEDURE EVALUATION
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 Responsible Provider: Solis Slater M.D.    Anesthesia Type: general ASA Status: 2 - Emergent          Final Anesthesia Type: general  Last vitals  BP   Blood Pressure : (!) 96/80    Temp   36.6 °C (97.8 °F)    Pulse   95   Resp   20    SpO2   94 %      Anesthesia Post Evaluation    Patient location during evaluation: PACU  Patient participation: complete - patient participated  Level of consciousness: awake and alert    Airway patency: patent  Anesthetic complications: no  Cardiovascular status: hemodynamically stable  Respiratory status: acceptable  Hydration status: euvolemic    PONV: none          There were no known notable events for this encounter.     Nurse Pain Score: 0 (NPRS)

## 2022-11-24 NOTE — ANESTHESIA PROCEDURE NOTES
Peripheral Block    Date/Time: 11/23/2022 6:05 PM  Performed by: Solis Slater M.D.  Authorized by: Solis Slater M.D.     Patient Location:  OR  Start Time:  11/23/2022 6:05 PM  End Time:  11/23/2022 6:06 PM  Reason for Block: at surgeon's request and post-op pain management ONLY    patient identified, IV checked, site marked, risks and benefits discussed, surgical consent, monitors and equipment checked, pre-op evaluation and timeout performed    Patient Position:  Supine  Prep: ChloraPrep    Monitoring:  Heart rate, continuous pulse ox and cardiac monitor  Block Region:  Trunk  Trunk - Block Type:  Abdominal plane block - TAP block    Laterality:  Bilateral  Procedures: ultrasound guided  Image captured, interpreted and electronically stored.  Local Infiltration:  Lidocaine  Strength:  1 %  Dose:  3 ml  Block Type:  Single-shot  Needle Length:  100mm  Needle Gauge:  21 G  Needle Localization:  Ultrasound guidance  Injection Assessment:  Negative aspiration for heme, no paresthesia on injection, incremental injection and local visualized surrounding nerve on ultrasound  Evidence of intravascular injection: No     US Guided Transversus Abdominis Plane (TAP) Block   US probe placed at the anterior axillary line between the costal margin and iliac crest in an axial plane. External Oblique, Internal Oblique (IO) and Transversis Abdominis (TA) muscles identified.  Needle inserted anteromedial to probe in an in plane approach and advanced under direct visualization to TAP between IO and TA muscled.  After negative aspiration LA injected with ease and visualized spreading in TAP plane.   30ml of 0.5% Bupivacaine was diluted into 20 ml of NS for a total of 50ml. 25ml of mixed solution was then injected on either side for the TAP Block.   Image in chart

## 2022-11-24 NOTE — OP REPORT
Operative Report    Date: 11/23/2022    Surgeon: Junior Shultz M.D.     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 male who presented with symptoms of bowel obstruction attempts at conservative management failed and patient is taken to the operating room for surgical management of his bowel obstruction.    The indications for a surgical assistant in this surgery were indicated due to complexity of the procedure. Their role included aiding in incision, retraction, holding devices including camera for laparoscopic procedure, and closure of the wound.      Findings: Internal volvulus with perforation at the base of the volvulus.  This was resected in its entirety and a side-to-side functional end-to-end isoperistaltic anastomosis was created    Wound Classification: Class IV, IV, Dirty or Infected. Infection present at the time of surgery (PATOS)..    Procedure in detail: The patient was seen and examined in the preoperative holding area.  The risks benefits and alternatives of the procedure were discussed with the patient who wished to proceed with the procedure as described.  The patient was transferred to the operating room placed in supine position and all pressure points were properly padded.  General endotracheal anesthesia was induced and preoperative antibiotics were given per SCIP protocol.  Patient's abdomen was prepped with ChloraPrep and draped in the normal sterile fashion.  A timeout was performed confirming correct patient, correct procedure, and that all necessary equipment was in the room.      We began the procedure by performing a 5 mm axis in the right upper quadrant was a 5 mm  Optiview port access the abdominal cavity new perineum was achieved and maintained to 15 mmHg carbon dioxide.  We then carefully inspect abdominal cavity and the bowel was significantly distended and unable to make significant progress laparoscopically and decided to convert to an open procedure report a midline laparotomy incision through his previous incision and use a combination of electrocautery and blunt dissection down to level the fascia.  The fascia with a sharp incised and extended the entire length of our skin incision.  We encountered a significant mount of small bowel adhesions some to the abdominal wall but mostly intraloop adhesions.  We began with careful dissection of the small bowel adhesions took approximately 45 minutes to free the bowel enough to identify the problem and allow for appropriate dressing of it.  There was a internal volvulus with a perforation in the mid small bowel.  The perforation was at the point of the internal volvulus.  We identified this area and resect that it in its entirety using 2 fires of blue load WHIT stapler we transected the mesentery with the LigaSure electrosurgical device.  This was passed off the specimen #1.  We then performed a side-to-side functional end-to-end isoperistaltic anastomosis with a single fire of the blue load Endo WHIT stapler and the blue load TA stapler to close the common enterotomy.  Then closed the mesenteric defect using a running 2-0 Vicryl suture.  Around the remainder of the small bowel and find a small serosal injury which was repaired with multiple interrupted 2-0 Vicryl sutures.  We then christine irrigated the abdomen and confirmed hemostasis.  We then closed the abdominal cavity with 2 running 0 PDS sutures and stapled the skin closed.    The patient was awakened from general anesthetic, and was taken to the recovery room in stable condition.    Sponge and needle counts were correct at the end of the case.     Specimen: Small bowel sent  to pathology    EBL: 200 mL    Dispo: stable, extubated, to PACU    Junior Shultz M.D.  [facility]  [phone]

## 2022-11-24 NOTE — OR NURSING
Patient allergies and NPO status verified, home medication reconciliation completed and belongings secured. Surgical site verified with patient. Patient verbalizes understanding of pain scale, expected course of stay and plan of care; patient and family state verbal understanding at this time. IV access established. Sequential in place as ordered.     NG from right nare; placed to suction and dark green bile noted.

## 2022-11-24 NOTE — ANESTHESIA TIME REPORT
Anesthesia Start and Stop Event Times     Date Time Event    11/23/2022 1555 Ready for Procedure     1626 Anesthesia Start     1819 Anesthesia Stop        Responsible Staff  11/23/22    Name Role Begin End    Solis Slater M.D. Anesth 1626 1819        Overtime Reason:  overtime    Comments:

## 2022-11-24 NOTE — PROGRESS NOTES
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  He does have some midline incisional tenderness.  He has not passed any flatus yet        ROS  Hemodynamics:  Temp (24hrs), Av.9 °C (98.5 °F), Min:36.3 °C (97.3 °F), Max:37.4 °C (99.3 °F)  Temperature: 37.4 °C (99.3 °F)  Pulse  Av.5  Min: 60  Max: 104   Blood Pressure : 112/61     Respiratory:    Respiration: 18, Pulse Oximetry: 93 %        RUL Breath Sounds: Clear, RML Breath Sounds: Clear, RLL Breath Sounds: Diminished, LILIYA Breath Sounds: Clear, LLL Breath Sounds: Diminished  Neuro:  GCS       Fluids:    Intake/Output Summary (Last 24 hours) at 2022 1028  Last data filed at 2022 0827  Gross per 24 hour   Intake 1000 ml   Output 925 ml   Net 75 ml     Weight: 60.7 kg (133 lb 13.1 oz)  No Active Diet Orders    Physical Exam  No acute distress, appears comfortable  NG tube in place  Abdomen soft, incision dressing clean dry and intact      Labs:  Recent Results (from the past 24 hour(s))   Prothrombin Time    Collection Time: 22  2:31 AM   Result Value Ref Range    PT 14.8 (H) 12.0 - 14.6 sec    INR 1.21 (H) 0.87 - 1.13   APTT    Collection Time: 22  2:31 AM   Result Value Ref Range    APTT 21.8 (L) 24.7 - 36.0 sec   MAGNESIUM    Collection Time: 22  2:31 AM   Result Value Ref Range    Magnesium 1.8 1.5 - 2.5 mg/dL   Comp Metabolic Panel    Collection Time: 22  2:31 AM   Result Value Ref Range    Sodium 147 (H) 135 - 145 mmol/L    Potassium 4.3 3.6 - 5.5 mmol/L    Chloride 111 96 - 112 mmol/L    Co2 23 20 - 33 mmol/L    Anion Gap 13.0 7.0 - 16.0    Glucose 136 (H) 65 - 99 mg/dL    Bun 27 (H) 8 - 22 mg/dL    Creatinine 1.37 0.50 - 1.40 mg/dL    Calcium 7.6 (L) 8.4 - 10.2 mg/dL     AST(SGOT) 19 12 - 45 U/L    ALT(SGPT) 17 2 - 50 U/L    Alkaline Phosphatase 42 30 - 99 U/L    Total Bilirubin 0.8 0.1 - 1.5 mg/dL    Albumin 2.6 (L) 3.2 - 4.9 g/dL    Total Protein 4.8 (L) 6.0 - 8.2 g/dL    Globulin 2.2 1.9 - 3.5 g/dL    A-G Ratio 1.2 g/dL   PHOSPHORUS    Collection Time: 11/24/22  2:31 AM   Result Value Ref Range    Phosphorus 4.6 (H) 2.5 - 4.5 mg/dL   ESTIMATED GFR    Collection Time: 11/24/22  2:31 AM   Result Value Ref Range    GFR (CKD-EPI) 54 (A) >60 mL/min/1.73 m 2   CBC WITHOUT DIFFERENTIAL    Collection Time: 11/24/22  6:30 AM   Result Value Ref Range    WBC 14.8 (H) 4.8 - 10.8 K/uL    RBC 4.44 (L) 4.70 - 6.10 M/uL    Hemoglobin 14.5 14.0 - 18.0 g/dL    Hematocrit 43.3 42.0 - 52.0 %    MCV 97.5 81.4 - 97.8 fL    MCH 32.7 27.0 - 33.0 pg    MCHC 33.5 (L) 33.7 - 35.3 g/dL    RDW 45.3 35.9 - 50.0 fL    Platelet Count 245 164 - 446 K/uL    MPV 9.8 9.0 - 12.9 fL     Medical Decision Making, by Problem:  Active Hospital Problems    Diagnosis     Hypokalemia [E87.6]     SBO (small bowel obstruction) (HCC) [K56.609]     Seizure disorder (HCC) [G40.909]     Anxiety [F41.9]     Dyslipidemia [E78.5]     Leukocytosis [D72.829]      Plan:  77-year-old male postop day 1 from exploratory laparotomy and small bowel resection for 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  Appreciate medical care  2.  As needed analgesia  3.  DVT prophylaxis  4.  NG tube to low continuous suction with every 12 hour flushes  5.  N.p.o. with ice chips  6.  Will follow closely    Quality Measures:  Quality-Core Measures    Discussed patient condition with Patient

## 2022-11-24 NOTE — PROGRESS NOTES
Hospital Medicine Daily Progress Note    Date of Service  11/25/2022    Chief Complaint  Hernán Walters is a 77 y.o. male admitted 11/18/2022 with N/V and abdominal pain    Hospital Course  Hernán Walters is a 77 y.o. male who presented 11/18/2022 with abdominal pain.  Patient states his pain initially started approximately a week ago however has been worsening.  He states its in his entire abdomen, sharp at times, can be severe.  He also states he began having nausea and vomiting and can no longer keep anything down.  Because of the ongoing nature of this and the worsening nature, he presented to the ER at an outlying facility.  There they did imaging and noted a small bowel obstruction.  Patient was transferred here for higher level of care.  Patient noted no bleeding.  He does have a history of appendectomy and hemicolectomy approximately 4 years ago.  Patient states he did have a tooth pulled and has been antibiotics on this for approximately 7 days.  He was transferred from outside ER to here at [facility] for higher level of care.      S/p ex lap OR report shows volvulus with perforation, required resection and anastomosis with Dr. Shultz.    Interval Problem Update  Patient went for surgery with Dr. Shultz.  Pt stated he was doing better, less pain, able to sit up.  OR report shows volvulus with perforation, required resection and anastomosis.   WBC elevated. Starting IV ceftriaxone and IV flagyl.    Continue Zyprexa oral dissolving tablet for night time    I have discussed this patient's plan of care and discharge plan at IDT rounds today with Case Management, Nursing, Nursing leadership, and other members of the IDT team.    Consultants/Specialty  general surgery (Dr. Osborne, Dr. Shultz)    Code Status  Full Code    Disposition  Patient is not medically cleared for discharge.   Anticipate discharge to  TBD .  I have placed the appropriate orders for post-discharge needs.    Review of Systems  Review of Systems    Constitutional:  Negative for fever and malaise/fatigue.   HENT:  Negative for congestion and hearing loss.    Eyes:  Negative for blurred vision and pain.   Respiratory:  Negative for cough and shortness of breath.    Gastrointestinal:  Positive for abdominal pain. Negative for nausea and vomiting.   Genitourinary:  Negative for dysuria and hematuria.   Neurological:  Negative for dizziness and headaches.   Psychiatric/Behavioral:  Negative for memory loss. The patient is not nervous/anxious and does not have insomnia.    All other systems reviewed and are negative.     Physical Exam  Temp:  [36.7 °C (98 °F)-37.7 °C (99.8 °F)] 37.4 °C (99.3 °F)  Pulse:  [86-97] 86  Resp:  [16-20] 16  BP: (108-144)/(66-93) 139/84  SpO2:  [90 %-91 %] 90 %    Physical Exam  Vitals and nursing note reviewed.   Constitutional:       General: He is not in acute distress.     Appearance: He is not ill-appearing.   HENT:      Head: Normocephalic and atraumatic.      Nose:      Comments: NGT in place, bilious output     Mouth/Throat:      Mouth: Mucous membranes are dry.      Pharynx: No oropharyngeal exudate.   Eyes:      General: No scleral icterus.     Extraocular Movements: Extraocular movements intact.   Cardiovascular:      Rate and Rhythm: Normal rate and regular rhythm.      Pulses: Normal pulses.      Heart sounds: Normal heart sounds. No murmur heard.  Pulmonary:      Effort: Pulmonary effort is normal. No respiratory distress.      Breath sounds: Normal breath sounds.   Abdominal:      General: There is no distension.      Tenderness: There is abdominal tenderness.      Comments: Hypoactive bowel sounds  Midline surgical sites, covered with guaze, some blood seen. Staples intact.   Musculoskeletal:         General: No swelling or tenderness. Normal range of motion.   Skin:     General: Skin is warm.      Capillary Refill: Capillary refill takes less than 2 seconds.      Coloration: Skin is not jaundiced or pale.   Neurological:       Mental Status: He is alert and oriented to person, place, and time. Mental status is at baseline.      Motor: No weakness.   Psychiatric:         Mood and Affect: Mood normal.         Behavior: Behavior normal.         Thought Content: Thought content normal.         Judgment: Judgment normal.       Fluids    Intake/Output Summary (Last 24 hours) at 11/25/2022 1430  Last data filed at 11/25/2022 1100  Gross per 24 hour   Intake --   Output 2100 ml   Net -2100 ml       Laboratory  Recent Labs     11/23/22  0646 11/24/22  0630 11/25/22  0328   WBC 5.6 14.8* 9.8   RBC 4.46* 4.44* 4.24*   HEMOGLOBIN 14.7 14.5 14.1   HEMATOCRIT 43.9 43.3 42.0   MCV 98.4* 97.5 99.1*   MCH 33.0 32.7 33.3*   MCHC 33.5* 33.5* 33.6*   RDW 45.1 45.3 46.3   PLATELETCT 234 245 244   MPV 9.9 9.8 10.3     Recent Labs     11/23/22  0646 11/24/22  0231 11/25/22  0328   SODIUM 148* 147* 145   POTASSIUM 3.2* 4.3 3.2*   CHLORIDE 114* 111 113*   CO2 23 23 23   GLUCOSE 114* 136* 107*   BUN 24* 27* 24*   CREATININE 0.81 1.37 1.05   CALCIUM 8.0* 7.6* 7.4*     Recent Labs     11/24/22  0231   APTT 21.8*   INR 1.21*               Imaging  DX-ABDOMEN FOR TUBE PLACEMENT   Final Result      NG tube terminates over the stomach      Similar gaseous distention of small bowel loops concerning for distal obstruction      DX-ABDOMEN FOR TUBE PLACEMENT   Final Result      NG tube has been advanced and now terminates over the gastric fundus in good position      DX-ABDOMEN FOR TUBE PLACEMENT   Final Result      NG tube terminates abnormally high, over the distal esophagus and there is continued gaseous distention of bowel loops      Voalte sent to and replied by HERNANDO RAMOS on 11/21/2022 1:36 PM.      DX-SMALL BOWEL SERIES   Final Result      1.  Difficult to definitively identify contrast within the colon 24 hour images due to dilution of contrast and suction of contrast from stomach.      2.  There is persistent dilated small bowel suggesting small bowel  obstruction. Consider CT scan for further evaluation.      DX-ABDOMEN FOR TUBE PLACEMENT   Final Result      Enteric tube has been placed and the tip projects over the stomach.      [id number] VIEW   Final Result      1.  Dilated loops of small bowel consistent with small bowel obstruction.      DX-CHEST-PORTABLE (1 VIEW)    (Results Pending)          Assessment/Plan  * SBO (small bowel obstruction) (HCC)- (present on admission)  Assessment & Plan  Transferred from [facility] for SBO    S/p ex lap with Dr. Suhltz on 11/23  -OR report shows volvulus with perforation, required resection and anastomosis  -keep NGT in place  -IV narcotics as needed  -Keep patient n.p.o. with ice chips  -Considering TPN if NGT remains.    Addendum:  I spent extra time face to face at patient's bedside.  Spoke with wife at bedside with patient and RN Colt.  Gave detailed updates. Explained we may need TPN if patient does not pass gas today. We will evaluate with SBFT tomorrow if that is the case. She had questions, particularly about why surgery took long to start. I explained to her our goal is to do the least invasive we can for patients, that more abdominal surgeries can predispose Mr Walters to future SBO. She understood and was greatful for our conversation.  Recommended for patient to walk today to help with bowel movement or flatulence.  Start time 1:45PM   End Time 2:25 PM    Volvulus of intestine (HCC)- (present on admission)  Assessment & Plan  S/p ex lap OR report shows volvulus with perforation, required resection and anastomosis with Dr. Shultz on 11/23.  Caused patient's SBO on admission  WBC improved. Continue IV ceftriaxone and IV flagyl.  Continue NGT to low intermittent suction  Gen sx following case    Hypophosphatemia  Assessment & Plan  1.8, pt is npo  Replacing via IV with potassium  Recheck daily  When patient is able to resume diet, high concern for potential Refeeding Syndrome    Hypomagnesemia  Assessment &  Plan  1.8, pt npo  Replacing via IV  Recheck labs daily  Improving 1.9, monitoring    Hypokalemia  Assessment & Plan  Due to GI condition and suction  Recheck lab daily  3.2, replacing with phos    Leukocytosis- (present on admission)  Assessment & Plan  - increased today after surgery and pt remains afebrile.  - OR report shows volvulus with perforation, required resection and anastomosis with Dr. Shultz.  - starting ceftriaxone and flagyl    Dyslipidemia- (present on admission)  Assessment & Plan  - Restart home statin when able    Anxiety- (present on admission)  Assessment & Plan  - Is on Klonopin twice a day, patient is unsure whether that is as needed or scheduled  -Start as needed Ativan  -Restart home Klonopin when able. Patient is also off of depakote which is helping his mood at home.  -continue Zyprexa oral dissolving tablet for night time, (QTc 464ms)    Seizure disorder (HCC)- (present on admission)  Assessment & Plan  - Patient does take oral Depakote, national shortage of IV valproate, will start IV keppra then resume depakote when taking PO.       VTE prophylaxis: SCDs/TEDs and pharmacologic prophylaxis contraindicated due to patient pulls out NGT, 4th episode, high risk for esophageal or nasal hemorrhage.    I have performed a physical exam and reviewed and updated ROS and Plan today (11/25/2022). In review of yesterday's note (11/24/2022), there are no changes except as documented above.

## 2022-11-24 NOTE — OR NURSING
1815: Patient arrived to PACU from OR via bed. Report received from anesthesia and RN. Respirations are spontaneous and unlabored. VSS on 6L. Dressing is CDI.     1830: family updated    1845: scant serosanguineous drainage marked on center abdominal dressing    1900: pt meets criteria for transfer to room. Receiving RN not yet ready    1926: Repot given to receiving RN. Transported by this RN

## 2022-11-24 NOTE — CARE PLAN
The patient is Stable - Low risk of patient condition declining or worsening    Shift Goals monitor dressing and NGT management  Clinical Goals: Monitor midline dressing and NGT  Patient Goals: Sleep comfortably with minimal pain  Family Goals: n/a    Progress made toward(s) clinical / shift goals:    Problem: Knowledge Deficit - Standard  Goal: Patient and family/care givers will demonstrate understanding of plan of care, disease process/condition, diagnostic tests and medications  Outcome: Progressing     Problem: Pain - Standard  Goal: Alleviation of pain or a reduction in pain to the patient’s comfort goal  Outcome: Progressing       Patient is not progressing towards the following goals:

## 2022-11-24 NOTE — ASSESSMENT & PLAN NOTE
1.8, pt npo  Replacing via IV  Recheck labs daily  Improving 1.9, monitoring. Replace via PO as patient is on psychiatric medications.

## 2022-11-24 NOTE — ANESTHESIA PROCEDURE NOTES
Airway    Date/Time: 11/23/2022 4:32 PM  Performed by: Solis Slater M.D.  Authorized by: Solis Slater M.D.     Location:  OR  Urgency:  Elective  Indications for Airway Management:  Anesthesia      Spontaneous Ventilation: absent    Sedation Level:  Deep  Preoxygenated: Yes    Patient Position:  Sniffing  Mask Difficulty Assessment:  0 - not attempted  Final Airway Type:  Endotracheal airway  Final Endotracheal Airway:  ETT  Cuffed: Yes    Technique Used for Successful ETT Placement:  Direct laryngoscopy  Devices/Methods Used in Placement:  Cricoid pressure    Insertion Site:  Oral  Blade Type:  Juliana  Laryngoscope Blade/Videolaryngoscope Blade Size:  3  ETT Size (mm):  7.0  Measured from:  Teeth  ETT to Teeth (cm):  23  Placement Verified by: auscultation and capnometry    Cormack-Lehane Classification:  Grade IIa - partial view of glottis  Number of Attempts at Approach:  1

## 2022-11-24 NOTE — DISCHARGE PLANNING
Per IDT rounds patient will be here for another 3-4 days for IV ABX before discharging home with outpatient PT/OT.

## 2022-11-25 ENCOUNTER — APPOINTMENT (OUTPATIENT)
Dept: RADIOLOGY | Facility: MEDICAL CENTER | Age: 77
DRG: 329 | End: 2022-11-25
Attending: INTERNAL MEDICINE
Payer: MEDICARE

## 2022-11-25 PROBLEM — K56.2 VOLVULUS OF INTESTINE (HCC): Status: ACTIVE | Noted: 2022-11-25

## 2022-11-25 PROBLEM — E83.39 HYPOPHOSPHATEMIA: Status: ACTIVE | Noted: 2022-11-25

## 2022-11-25 LAB
ALBUMIN SERPL BCP-MCNC: 2.3 G/DL (ref 3.2–4.9)
BUN SERPL-MCNC: 24 MG/DL (ref 8–22)
CALCIUM SERPL-MCNC: 7.4 MG/DL (ref 8.4–10.2)
CHLORIDE SERPL-SCNC: 113 MMOL/L (ref 96–112)
CO2 SERPL-SCNC: 23 MMOL/L (ref 20–33)
CREAT SERPL-MCNC: 1.05 MG/DL (ref 0.5–1.4)
ERYTHROCYTE [DISTWIDTH] IN BLOOD BY AUTOMATED COUNT: 46.3 FL (ref 35.9–50)
GFR SERPLBLD CREATININE-BSD FMLA CKD-EPI: 74 ML/MIN/1.73 M 2
GLUCOSE SERPL-MCNC: 107 MG/DL (ref 65–99)
HCT VFR BLD AUTO: 42 % (ref 42–52)
HGB BLD-MCNC: 14.1 G/DL (ref 14–18)
MAGNESIUM SERPL-MCNC: 1.9 MG/DL (ref 1.5–2.5)
MCH RBC QN AUTO: 33.3 PG (ref 27–33)
MCHC RBC AUTO-ENTMCNC: 33.6 G/DL (ref 33.7–35.3)
MCV RBC AUTO: 99.1 FL (ref 81.4–97.8)
PATHOLOGY CONSULT NOTE: NORMAL
PHOSPHATE SERPL-MCNC: 1.8 MG/DL (ref 2.5–4.5)
PLATELET # BLD AUTO: 244 K/UL (ref 164–446)
PMV BLD AUTO: 10.3 FL (ref 9–12.9)
POTASSIUM SERPL-SCNC: 3.2 MMOL/L (ref 3.6–5.5)
RBC # BLD AUTO: 4.24 M/UL (ref 4.7–6.1)
SODIUM SERPL-SCNC: 145 MMOL/L (ref 135–145)
WBC # BLD AUTO: 9.8 K/UL (ref 4.8–10.8)

## 2022-11-25 PROCEDURE — 97164 PT RE-EVAL EST PLAN CARE: CPT

## 2022-11-25 PROCEDURE — 36415 COLL VENOUS BLD VENIPUNCTURE: CPT

## 2022-11-25 PROCEDURE — A9270 NON-COVERED ITEM OR SERVICE: HCPCS | Performed by: INTERNAL MEDICINE

## 2022-11-25 PROCEDURE — 71045 X-RAY EXAM CHEST 1 VIEW: CPT

## 2022-11-25 PROCEDURE — 700111 HCHG RX REV CODE 636 W/ 250 OVERRIDE (IP): Performed by: INTERNAL MEDICINE

## 2022-11-25 PROCEDURE — 85027 COMPLETE CBC AUTOMATED: CPT

## 2022-11-25 PROCEDURE — 83735 ASSAY OF MAGNESIUM: CPT

## 2022-11-25 PROCEDURE — 99233 SBSQ HOSP IP/OBS HIGH 50: CPT | Performed by: INTERNAL MEDICINE

## 2022-11-25 PROCEDURE — 700102 HCHG RX REV CODE 250 W/ 637 OVERRIDE(OP): Performed by: INTERNAL MEDICINE

## 2022-11-25 PROCEDURE — 94760 N-INVAS EAR/PLS OXIMETRY 1: CPT

## 2022-11-25 PROCEDURE — 700105 HCHG RX REV CODE 258: Performed by: INTERNAL MEDICINE

## 2022-11-25 PROCEDURE — 770006 HCHG ROOM/CARE - MED/SURG/GYN SEMI*

## 2022-11-25 PROCEDURE — 80069 RENAL FUNCTION PANEL: CPT

## 2022-11-25 PROCEDURE — 700101 HCHG RX REV CODE 250: Performed by: INTERNAL MEDICINE

## 2022-11-25 RX ADMIN — POTASSIUM PHOSPHATE, MONOBASIC AND POTASSIUM PHOSPHATE, DIBASIC 15 MMOL: 224; 236 INJECTION, SOLUTION, CONCENTRATE INTRAVENOUS at 08:53

## 2022-11-25 RX ADMIN — LEVETIRACETAM 500 MG: 100 INJECTION, SOLUTION INTRAVENOUS at 08:53

## 2022-11-25 RX ADMIN — OLANZAPINE 5 MG: 5 TABLET, ORALLY DISINTEGRATING ORAL at 17:38

## 2022-11-25 RX ADMIN — CEFTRIAXONE SODIUM 2000 MG: 2 INJECTION, POWDER, FOR SOLUTION INTRAMUSCULAR; INTRAVENOUS at 06:55

## 2022-11-25 RX ADMIN — METRONIDAZOLE 500 MG: 500 INJECTION, SOLUTION INTRAVENOUS at 05:51

## 2022-11-25 RX ADMIN — METRONIDAZOLE 500 MG: 500 INJECTION, SOLUTION INTRAVENOUS at 14:08

## 2022-11-25 RX ADMIN — LORAZEPAM 0.5 MG: 2 INJECTION INTRAMUSCULAR; INTRAVENOUS at 04:16

## 2022-11-25 RX ADMIN — HYDROMORPHONE HYDROCHLORIDE 0.5 MG: 1 INJECTION, SOLUTION INTRAMUSCULAR; INTRAVENOUS; SUBCUTANEOUS at 04:09

## 2022-11-25 RX ADMIN — METRONIDAZOLE 500 MG: 500 INJECTION, SOLUTION INTRAVENOUS at 22:26

## 2022-11-25 RX ADMIN — LEVETIRACETAM 500 MG: 100 INJECTION, SOLUTION INTRAVENOUS at 22:16

## 2022-11-25 ASSESSMENT — PATIENT HEALTH QUESTIONNAIRE - PHQ9
SUM OF ALL RESPONSES TO PHQ9 QUESTIONS 1 AND 2: 1
7. TROUBLE CONCENTRATING ON THINGS, SUCH AS READING THE NEWSPAPER OR WATCHING TELEVISION: NOT AT ALL
SUM OF ALL RESPONSES TO PHQ QUESTIONS 1-9: 2
5. POOR APPETITE OR OVEREATING: NOT AT ALL
3. TROUBLE FALLING OR STAYING ASLEEP OR SLEEPING TOO MUCH: NOT AT ALL
6. FEELING BAD ABOUT YOURSELF - OR THAT YOU ARE A FAILURE OR HAVE LET YOURSELF OR YOUR FAMILY DOWN: NOT AL ALL
2. FEELING DOWN, DEPRESSED, IRRITABLE, OR HOPELESS: SEVERAL DAYS
8. MOVING OR SPEAKING SO SLOWLY THAT OTHER PEOPLE COULD HAVE NOTICED. OR THE OPPOSITE, BEING SO FIGETY OR RESTLESS THAT YOU HAVE BEEN MOVING AROUND A LOT MORE THAN USUAL: NOT AT ALL
9. THOUGHTS THAT YOU WOULD BE BETTER OFF DEAD, OR OF HURTING YOURSELF: NOT AT ALL
4. FEELING TIRED OR HAVING LITTLE ENERGY: SEVERAL DAYS
1. LITTLE INTEREST OR PLEASURE IN DOING THINGS: NOT AT ALL

## 2022-11-25 ASSESSMENT — ENCOUNTER SYMPTOMS
NAUSEA: 0
COUGH: 0
HEADACHES: 0
MEMORY LOSS: 0
FEVER: 0
SHORTNESS OF BREATH: 0
INSOMNIA: 0
NERVOUS/ANXIOUS: 0
EYE PAIN: 0
ABDOMINAL PAIN: 1
DIZZINESS: 0
VOMITING: 0
BLURRED VISION: 0

## 2022-11-25 ASSESSMENT — GAIT ASSESSMENTS
ASSISTIVE DEVICE: FRONT WHEEL WALKER
GAIT LEVEL OF ASSIST: SUPERVISED
DISTANCE (FEET): 150
DEVIATION: STEP TO

## 2022-11-25 ASSESSMENT — PAIN DESCRIPTION - PAIN TYPE: TYPE: ACUTE PAIN

## 2022-11-25 NOTE — PROGRESS NOTES
[facility] Medicine Daily Progress Note    Date of Service  11/25/2022    Chief Complaint  Hernán Walters is a 77 y.o. male admitted 11/18/2022 with N/V and abdominal pain    Hospital Course  Hernán Walters is a 77 y.o. male who presented 11/18/2022 with abdominal pain.  Patient states his pain initially started approximately a week ago however has been worsening.  He states its in his entire abdomen, sharp at times, can be severe.  He also states he began having nausea and vomiting and can no longer keep anything down.  Because of the ongoing nature of this and the worsening nature, he presented to the ER at an outlying facility.  There they did imaging and noted a small bowel obstruction.  Patient was transferred here for higher level of care.  Patient noted no bleeding.  He does have a history of appendectomy and hemicolectomy approximately 4 years ago.  Patient states he did have a tooth pulled and has been antibiotics on this for approximately 7 days.  He was transferred from outside ER to here at [facility] for higher level of care.      S/p ex lap OR report shows volvulus with perforation, required resection and anastomosis with Dr. Shultz.    Interval Problem Update  Patient stated he has less pain today, but not passing gas or BM. Has been spitting saliva up. NGT output with bilious fluid.    WBC improved. Continue IV ceftriaxone and IV flagyl.  Mag 1.9  K 3.2, phos 1.8 replacing via IV    Continue Zyprexa oral dissolving tablet for night time  Considering TPN if NGT remains.    ADDENDUM: 1:45PM  Spoke with wife.  Gave detailed updates. Explained we may need TPN if patient does not pass gas today. We will evaluate with SBFT tomorrow if that is the case. She had questions, particularly about why surgery took long to start. I explained to her our goal is to do the least invasive we can for patients, that more abdominal surgeries can predispose Mr Walters to future SBO. She understood and was greatful for our  conversation.  Recommended for patient to walk today to help with bowel movement or flatulence.    I have discussed this patient's plan of care and discharge plan at IDT rounds today with Case Management, Nursing, Nursing leadership, and other members of the IDT team.    Consultants/Specialty  general surgery (Dr. Osborne, Dr. Shultz, Dr. Ziegler)    Code Status  Full Code    Disposition  Patient is not medically cleared for discharge.   Anticipate discharge to  TBD .  I have placed the appropriate orders for post-discharge needs.    Review of Systems  Review of Systems   Constitutional:  Negative for fever and malaise/fatigue.   HENT:  Negative for congestion and hearing loss.    Eyes:  Negative for blurred vision and pain.   Respiratory:  Negative for cough and shortness of breath.    Gastrointestinal:  Positive for abdominal pain. Negative for nausea and vomiting.   Genitourinary:  Negative for dysuria and hematuria.   Neurological:  Negative for dizziness and headaches.   Psychiatric/Behavioral:  Negative for memory loss. The patient is not nervous/anxious and does not have insomnia.    All other systems reviewed and are negative.     Physical Exam  Temp:  [36.7 °C (98 °F)-37.7 °C (99.8 °F)] 37.4 °C (99.3 °F)  Pulse:  [86-97] 86  Resp:  [16-20] 16  BP: (108-144)/(66-93) 139/84  SpO2:  [90 %-91 %] 90 %    Physical Exam  Vitals and nursing note reviewed.   Constitutional:       General: He is not in acute distress.     Appearance: He is not ill-appearing.   HENT:      Head: Normocephalic and atraumatic.      Nose:      Comments: NGT in place, bilious output     Mouth/Throat:      Mouth: Mucous membranes are dry.      Pharynx: No oropharyngeal exudate.   Eyes:      General: No scleral icterus.     Extraocular Movements: Extraocular movements intact.   Cardiovascular:      Rate and Rhythm: Normal rate and regular rhythm.      Pulses: Normal pulses.      Heart sounds: Normal heart sounds. No murmur heard.  Pulmonary:       Effort: Pulmonary effort is normal. No respiratory distress.      Breath sounds: Normal breath sounds.   Abdominal:      General: There is no distension.      Tenderness: There is abdominal tenderness.      Comments: Hypoactive bowel sounds  Midline surgical sites, covered with guaze, some blood seen. Staples intact.   Musculoskeletal:         General: No swelling or tenderness. Normal range of motion.   Skin:     General: Skin is warm.      Capillary Refill: Capillary refill takes less than 2 seconds.      Coloration: Skin is not jaundiced or pale.   Neurological:      Mental Status: He is alert and oriented to person, place, and time. Mental status is at baseline.      Motor: No weakness.   Psychiatric:         Mood and Affect: Mood normal.         Behavior: Behavior normal.         Thought Content: Thought content normal.         Judgment: Judgment normal.       Fluids    Intake/Output Summary (Last 24 hours) at 11/25/2022 1429  Last data filed at 11/25/2022 1100  Gross per 24 hour   Intake --   Output 2100 ml   Net -2100 ml       Laboratory  Recent Labs     11/23/22  0646 11/24/22  0630 11/25/22  0328   WBC 5.6 14.8* 9.8   RBC 4.46* 4.44* 4.24*   HEMOGLOBIN 14.7 14.5 14.1   HEMATOCRIT 43.9 43.3 42.0   MCV 98.4* 97.5 99.1*   MCH 33.0 32.7 33.3*   MCHC 33.5* 33.5* 33.6*   RDW 45.1 45.3 46.3   PLATELETCT 234 245 244   MPV 9.9 9.8 10.3     Recent Labs     11/23/22  0646 11/24/22  0231 11/25/22  0328   SODIUM 148* 147* 145   POTASSIUM 3.2* 4.3 3.2*   CHLORIDE 114* 111 113*   CO2 23 23 23   GLUCOSE 114* 136* 107*   BUN 24* 27* 24*   CREATININE 0.81 1.37 1.05   CALCIUM 8.0* 7.6* 7.4*     Recent Labs     11/24/22  0231   APTT 21.8*   INR 1.21*               Imaging  DX-ABDOMEN FOR TUBE PLACEMENT   Final Result      NG tube terminates over the stomach      Similar gaseous distention of small bowel loops concerning for distal obstruction      DX-ABDOMEN FOR TUBE PLACEMENT   Final Result      NG tube has been advanced and  now terminates over the gastric fundus in good position      DX-ABDOMEN FOR TUBE PLACEMENT   Final Result      NG tube terminates abnormally high, over the distal esophagus and there is continued gaseous distention of bowel loops      Voalte sent to and replied by HERNANDO RAMOS on 11/21/2022 1:36 PM.      DX-SMALL BOWEL SERIES   Final Result      1.  Difficult to definitively identify contrast within the colon 24 hour images due to dilution of contrast and suction of contrast from stomach.      2.  There is persistent dilated small bowel suggesting small bowel obstruction. Consider CT scan for further evaluation.      DX-ABDOMEN FOR TUBE PLACEMENT   Final Result      Enteric tube has been placed and the tip projects over the stomach.      [id number] VIEW   Final Result      1.  Dilated loops of small bowel consistent with small bowel obstruction.      DX-CHEST-PORTABLE (1 VIEW)    (Results Pending)          Assessment/Plan  * SBO (small bowel obstruction) (HCC)- (present on admission)  Assessment & Plan  Transferred from [facility] for SBO    S/p ex lap with Dr. Shultz on 11/23  -OR report shows volvulus with perforation, required resection and anastomosis  -keep NGT in place  -IV narcotics as needed  -Keep patient n.p.o. with ice chips  -Considering TPN if NGT remains.    Addendum:  Spoke with wife.  Gave detailed updates. Explained we may need TPN if patient does not pass gas today. We will evaluate with SBFT tomorrow if that is the case. She had questions, particularly about why surgery took long to start. I explained to her our goal is to do the least invasive we can for patients, that more abdominal surgeries can predispose Mr Walters to future SBO. She understood and was greatful for our conversation.  Recommended for patient to walk today to help with bowel movement or flatulence.  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 Shultz on 11/23.  Caused patient's SBO on admission  WBC improved. Continue IV ceftriaxone and IV flagyl.  Continue NGT to low intermittent suction  Gen sx following case    Hypophosphatemia  Assessment & Plan  1.8, pt is npo  Replacing via IV with potassium  Recheck daily  When patient is able to resume diet, high concern for potential Refeeding Syndrome    Hypomagnesemia  Assessment & Plan  1.8, pt npo  Replacing via IV  Recheck labs daily  Improving 1.9, monitoring    Hypokalemia  Assessment & Plan  Due to GI condition and suction  Recheck lab daily  3.2, replacing with phos    Leukocytosis- (present on admission)  Assessment & Plan  - increased today after surgery and pt remains afebrile.  - OR report shows volvulus with perforation, required resection and anastomosis with Dr. Shultz.  - starting ceftriaxone and flagyl    Dyslipidemia- (present on admission)  Assessment & Plan  - Restart home statin when able    Anxiety- (present on admission)  Assessment & Plan  - Is on Klonopin twice a day, patient is unsure whether that is as needed or scheduled  -Start as needed Ativan  -Restart home Klonopin when able. Patient is also off of depakote which is helping his mood at home.  -continue Zyprexa oral dissolving tablet for night time, (QTc 464ms)    Seizure disorder (HCC)- (present on admission)  Assessment & Plan  - Patient does take oral Depakote, national shortage of IV valproate, will start IV keppra then resume depakote when taking PO.       VTE prophylaxis: SCDs/TEDs and pharmacologic prophylaxis contraindicated due to patient pulls out NGT, 4th episode, high risk for esophageal or nasal hemorrhage.    I have performed a physical exam and reviewed and updated ROS and Plan today (11/25/2022). In review of yesterday's note (11/24/2022), there are no changes except as documented above.

## 2022-11-25 NOTE — ASSESSMENT & PLAN NOTE
S/p ex lap OR report shows volvulus with perforation, required resection and anastomosis with Dr. Shultz on 11/23.  Caused patient's SBO on admission  WBC increased again. Continue IV ceftriaxone and IV flagyl.  NGT removed 11/26. Pt tolerating diet

## 2022-11-25 NOTE — PROGRESS NOTES
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  He does have some midline incisional tenderness.  He has not passed any flatus yet        ROS  Hemodynamics:  Temp (24hrs), Av.2 °C (98.9 °F), Min:36.7 °C (98 °F), Max:37.7 °C (99.8 °F)  Temperature: 36.7 °C (98 °F)  Pulse  Av.3  Min: 60  Max: 104   Blood Pressure : (!) 144/73     Respiratory:    Respiration: 20, Pulse Oximetry: 90 %        RUL Breath Sounds: Clear, RML Breath Sounds: Clear, RLL Breath Sounds: Clear, LILIYA Breath Sounds: Clear, LLL Breath Sounds: Clear  Neuro:  GCS       Fluids:    Intake/Output Summary (Last 24 hours) at 2022 1028  Last data filed at 2022 0827  Gross per 24 hour   Intake 1000 ml   Output 925 ml   Net 75 ml        Current Diet Order   Procedures    Diet NPO Restrict to: Ice Chips       Physical Exam  No acute distress, appears comfortable  NG tube in place  Abdomen soft, incision dressing clean dry and intact      Labs:  Recent Results (from the past 24 hour(s))   Renal Function Panel    Collection Time: 22  3:28 AM   Result Value Ref Range    Sodium 145 135 - 145 mmol/L    Potassium 3.2 (L) 3.6 - 5.5 mmol/L    Chloride 113 (H) 96 - 112 mmol/L    Co2 23 20 - 33 mmol/L    Glucose 107 (H) 65 - 99 mg/dL    Creatinine 1.05 0.50 - 1.40 mg/dL    Bun 24 (H) 8 - 22 mg/dL    Calcium 7.4 (L) 8.4 - 10.2 mg/dL    Phosphorus 1.8 (L) 2.5 - 4.5 mg/dL    Albumin 2.3 (L) 3.2 - 4.9 g/dL   MAGNESIUM    Collection Time: 22  3:28 AM   Result Value Ref Range    Magnesium 1.9 1.5 - 2.5 mg/dL   CBC WITHOUT DIFFERENTIAL    Collection Time: 22  3:28 AM   Result Value Ref Range    WBC 9.8 4.8 - 10.8 K/uL    RBC 4.24 (L) 4.70 - 6.10 M/uL    Hemoglobin 14.1 14.0 - 18.0 g/dL     Hematocrit 42.0 42.0 - 52.0 %    MCV 99.1 (H) 81.4 - 97.8 fL    MCH 33.3 (H) 27.0 - 33.0 pg    MCHC 33.6 (L) 33.7 - 35.3 g/dL    RDW 46.3 35.9 - 50.0 fL    Platelet Count 244 164 - 446 K/uL    MPV 10.3 9.0 - 12.9 fL   ESTIMATED GFR    Collection Time: 11/25/22  3:28 AM   Result Value Ref Range    GFR (CKD-EPI) 74 >60 mL/min/1.73 m 2     Medical Decision Making, by Problem:  Active Hospital Problems    Diagnosis     Hypophosphatemia [E83.39]     Volvulus of intestine (HCC) [K56.2]     Hypomagnesemia [E83.42]     Hypokalemia [E87.6]     SBO (small bowel obstruction) (HCC) [K56.609]     Seizure disorder (HCC) [G40.909]     Anxiety [F41.9]     Dyslipidemia [E78.5]     Leukocytosis [D72.829]      Plan:  77-year-old male postop day 1 from exploratory laparotomy and small bowel resection for 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  Appreciate medical care  2.  As needed analgesia  3.  DVT prophylaxis  4.  DC NGT today if flatus  5.  N.p.o. with ice chips, CLD after NGT out  6.  Will follow closely    Quality Measures:  Quality-Core Measures    Discussed patient condition with Patient

## 2022-11-25 NOTE — ASSESSMENT & PLAN NOTE
1.8, pt is npo  Replacing via IV with potassium  Recheck daily    2.5 stable today  When patient is able to resume diet, high concern for potential Refeeding Syndrome

## 2022-11-25 NOTE — DISCHARGE PLANNING
Case Management Discharge Planning    Admission Date: 11/18/2022  GMLOS: 2.3  ALOS: 7    6-Clicks ADL Score: 17  6-Clicks Mobility Score: 18  PT and/or OT Eval ordered: Yes  Post-acute Referrals Ordered: NA  Post-acute Choice Obtained: NA  Has referral(s) been sent to post-acute provider:  JESSICA      Anticipated Discharge Dispo: Discharge Disposition: Discharged to home/self care (01)    DME Needed: No    Action(s) Taken: Updated Provider/Nurse on Discharge Plan    Anticipate patient to DC home with OP PT/OT. No anticipated DC needs at this time.  RN CM will continue to follow.    Escalations Completed: None    Medically Clear: No    Next Steps: Medical clearance    Barriers to Discharge: Medical clearance

## 2022-11-25 NOTE — DIETARY
Nutrition Services Brief Update:    Day 7 of admit.  Hernán Walters is a 77 y.o. male with admitting DX of SBO (small bowel obstruction) (HCC) [K56.609]    Current Diet: NPO x 8 days    Pt is status post exploratory laparotomy and small bowel resection on 11/23/2022 for internal volvulus with necrotic small bowel and perforation identified. Per Surgeon's note, plan is to DC NGT if pt has flatus. Once NGT is out, clear liquid diet will be ordered. RD asked hospitalist if TPN is a possibility. At this point it is not because of possible initiation of diet w/ removal of NGT and higher risks w/ TPN.    Problem: Nutritional:  Goal: Achieve adequate nutritional intake  Description: Initiation of diet and advancement beyond clears. Patient will consume >50% of meals  Outcome: not met    RD will follow.

## 2022-11-25 NOTE — THERAPY
Physical Therapy   Daily Treatment     Patient Name: Hernán Walters  Age:  77 y.o., Sex:  male  Medical Record #: [id number]  Today's Date: 11/25/2022          Assessment    Pt is doing well following surgery,02 sat 87% on room air with ambulation.Pt is safe with bed mob,transfers and ambulation.He has no equipment needs and appears safe for home when medically cleared    Plan    Discharge secondary to goals met.    DC Equipment Recommendations: None  Discharge Recommendations: Recommend home health for continued physical therapy services       Objective       11/25/22 1400   Charge Group   PT Re-evaluation PT Re-evaluation   Cognition    Cognition / Consciousness WDL   Balance   Sitting Balance (Static) Good   Sitting Balance (Dynamic) Good   Standing Balance (Static) Fair +   Standing Balance (Dynamic) Fair +   Weight Shift Sitting Good   Weight Shift Standing Good   Gait Analysis   Gait Level Of Assist Supervised   Assistive Device Front Wheel Walker   Distance (Feet) 150   # of Times Distance was Traveled 1   Deviation Step To   Weight Bearing Status full   Bed Mobility    Supine to Sit Modified Independent   Sit to Supine Modified Independent   Scooting Modified Independent   Functional Mobility   Sit to Stand Supervised   Bed, Chair, Wheelchair Transfer Supervised   Transfer Method Stand Step   Activity Tolerance   Sitting Edge of Bed 10   Standing 10   Anticipated Discharge Equipment and Recommendations   DC Equipment Recommendations None   Discharge Recommendations Recommend home health for continued physical therapy services   Interdisciplinary Plan of Care Collaboration   IDT Collaboration with  Nursing   Session Information   Date / Session Number  11/25   Priority 0

## 2022-11-25 NOTE — CARE PLAN
The patient is Stable - Low risk of patient condition declining or worsening    Shift Goals  Clinical Goals: Pt will not remove NGT, wound healing.  Patient Goals: to go home  Family Goals: n/a    Progress made toward(s) clinical / shift goals:  Pt tolerating NGT at low intermittant suction. Wound/incision healing progressing.    Patient is not progressing towards the following goals:

## 2022-11-26 LAB
ALBUMIN SERPL BCP-MCNC: 2.4 G/DL (ref 3.2–4.9)
BUN SERPL-MCNC: 26 MG/DL (ref 8–22)
CALCIUM SERPL-MCNC: 7.8 MG/DL (ref 8.4–10.2)
CHLORIDE SERPL-SCNC: 115 MMOL/L (ref 96–112)
CO2 SERPL-SCNC: 20 MMOL/L (ref 20–33)
CREAT SERPL-MCNC: 0.82 MG/DL (ref 0.5–1.4)
ERYTHROCYTE [DISTWIDTH] IN BLOOD BY AUTOMATED COUNT: 46.3 FL (ref 35.9–50)
GFR SERPLBLD CREATININE-BSD FMLA CKD-EPI: 91 ML/MIN/1.73 M 2
GLUCOSE SERPL-MCNC: 89 MG/DL (ref 65–99)
HCT VFR BLD AUTO: 42 % (ref 42–52)
HGB BLD-MCNC: 13.7 G/DL (ref 14–18)
MAGNESIUM SERPL-MCNC: 1.9 MG/DL (ref 1.5–2.5)
MCH RBC QN AUTO: 32.3 PG (ref 27–33)
MCHC RBC AUTO-ENTMCNC: 32.6 G/DL (ref 33.7–35.3)
MCV RBC AUTO: 99.1 FL (ref 81.4–97.8)
PHOSPHATE SERPL-MCNC: 2.5 MG/DL (ref 2.5–4.5)
PLATELET # BLD AUTO: 269 K/UL (ref 164–446)
PMV BLD AUTO: 10.3 FL (ref 9–12.9)
POTASSIUM SERPL-SCNC: 3.6 MMOL/L (ref 3.6–5.5)
PREALB SERPL-MCNC: 6.1 MG/DL (ref 18–38)
PROCALCITONIN SERPL-MCNC: 2.56 NG/ML
RBC # BLD AUTO: 4.24 M/UL (ref 4.7–6.1)
SODIUM SERPL-SCNC: 148 MMOL/L (ref 135–145)
WBC # BLD AUTO: 12.9 K/UL (ref 4.8–10.8)

## 2022-11-26 PROCEDURE — 85027 COMPLETE CBC AUTOMATED: CPT

## 2022-11-26 PROCEDURE — 80069 RENAL FUNCTION PANEL: CPT

## 2022-11-26 PROCEDURE — A9270 NON-COVERED ITEM OR SERVICE: HCPCS | Performed by: INTERNAL MEDICINE

## 2022-11-26 PROCEDURE — 700102 HCHG RX REV CODE 250 W/ 637 OVERRIDE(OP): Performed by: INTERNAL MEDICINE

## 2022-11-26 PROCEDURE — 94760 N-INVAS EAR/PLS OXIMETRY 1: CPT

## 2022-11-26 PROCEDURE — 770006 HCHG ROOM/CARE - MED/SURG/GYN SEMI*

## 2022-11-26 PROCEDURE — 99232 SBSQ HOSP IP/OBS MODERATE 35: CPT | Performed by: INTERNAL MEDICINE

## 2022-11-26 PROCEDURE — 84134 ASSAY OF PREALBUMIN: CPT

## 2022-11-26 PROCEDURE — 83735 ASSAY OF MAGNESIUM: CPT

## 2022-11-26 PROCEDURE — 700111 HCHG RX REV CODE 636 W/ 250 OVERRIDE (IP): Performed by: INTERNAL MEDICINE

## 2022-11-26 PROCEDURE — 84145 PROCALCITONIN (PCT): CPT

## 2022-11-26 PROCEDURE — 36415 COLL VENOUS BLD VENIPUNCTURE: CPT

## 2022-11-26 PROCEDURE — 700105 HCHG RX REV CODE 258: Performed by: INTERNAL MEDICINE

## 2022-11-26 PROCEDURE — 700101 HCHG RX REV CODE 250: Performed by: INTERNAL MEDICINE

## 2022-11-26 RX ORDER — ACETAMINOPHEN 325 MG/1
650 TABLET ORAL EVERY 6 HOURS PRN
Status: DISCONTINUED | OUTPATIENT
Start: 2022-11-26 | End: 2022-11-28 | Stop reason: HOSPADM

## 2022-11-26 RX ORDER — ONDANSETRON 4 MG/1
4 TABLET, ORALLY DISINTEGRATING ORAL EVERY 4 HOURS PRN
Status: DISCONTINUED | OUTPATIENT
Start: 2022-11-26 | End: 2022-11-28 | Stop reason: HOSPADM

## 2022-11-26 RX ORDER — LANOLIN ALCOHOL/MO/W.PET/CERES
400 CREAM (GRAM) TOPICAL 2 TIMES DAILY
Status: DISCONTINUED | OUTPATIENT
Start: 2022-11-26 | End: 2022-11-28 | Stop reason: HOSPADM

## 2022-11-26 RX ORDER — CLONAZEPAM 0.5 MG/1
0.5 TABLET ORAL 2 TIMES DAILY
Status: DISCONTINUED | OUTPATIENT
Start: 2022-11-26 | End: 2022-11-28 | Stop reason: HOSPADM

## 2022-11-26 RX ORDER — HEPARIN SODIUM 5000 [USP'U]/ML
5000 INJECTION, SOLUTION INTRAVENOUS; SUBCUTANEOUS EVERY 8 HOURS
Status: DISCONTINUED | OUTPATIENT
Start: 2022-11-26 | End: 2022-11-28 | Stop reason: HOSPADM

## 2022-11-26 RX ORDER — DIVALPROEX SODIUM 250 MG/1
250 TABLET, DELAYED RELEASE ORAL DAILY
Status: DISCONTINUED | OUTPATIENT
Start: 2022-11-26 | End: 2022-11-28 | Stop reason: HOSPADM

## 2022-11-26 RX ADMIN — CEFTRIAXONE SODIUM 2000 MG: 2 INJECTION, POWDER, FOR SOLUTION INTRAMUSCULAR; INTRAVENOUS at 05:37

## 2022-11-26 RX ADMIN — HEPARIN SODIUM 5000 UNITS: 5000 INJECTION, SOLUTION INTRAVENOUS; SUBCUTANEOUS at 15:14

## 2022-11-26 RX ADMIN — Medication 400 MG: at 17:20

## 2022-11-26 RX ADMIN — ACETAMINOPHEN 650 MG: 325 TABLET, FILM COATED ORAL at 08:29

## 2022-11-26 RX ADMIN — METRONIDAZOLE 500 MG: 500 INJECTION, SOLUTION INTRAVENOUS at 06:27

## 2022-11-26 RX ADMIN — CLONAZEPAM 0.5 MG: 0.5 TABLET ORAL at 15:15

## 2022-11-26 RX ADMIN — LEVETIRACETAM 500 MG: 100 INJECTION, SOLUTION INTRAVENOUS at 08:30

## 2022-11-26 RX ADMIN — HEPARIN SODIUM 5000 UNITS: 5000 INJECTION, SOLUTION INTRAVENOUS; SUBCUTANEOUS at 22:23

## 2022-11-26 RX ADMIN — DIVALPROEX SODIUM 250 MG: 250 TABLET, DELAYED RELEASE ORAL at 15:15

## 2022-11-26 RX ADMIN — METRONIDAZOLE 500 MG: 500 INJECTION, SOLUTION INTRAVENOUS at 22:20

## 2022-11-26 RX ADMIN — METRONIDAZOLE 500 MG: 500 INJECTION, SOLUTION INTRAVENOUS at 13:53

## 2022-11-26 ASSESSMENT — ENCOUNTER SYMPTOMS
ABDOMINAL PAIN: 0
NERVOUS/ANXIOUS: 0
VOMITING: 0
BLURRED VISION: 0
MEMORY LOSS: 0
NAUSEA: 0
FEVER: 0
HEADACHES: 0
SHORTNESS OF BREATH: 0
INSOMNIA: 0
EYE PAIN: 0
DIZZINESS: 0
COUGH: 0

## 2022-11-26 ASSESSMENT — PATIENT HEALTH QUESTIONNAIRE - PHQ9
SUM OF ALL RESPONSES TO PHQ9 QUESTIONS 1 AND 2: 0
1. LITTLE INTEREST OR PLEASURE IN DOING THINGS: NOT AT ALL

## 2022-11-26 ASSESSMENT — PAIN DESCRIPTION - PAIN TYPE: TYPE: ACUTE PAIN

## 2022-11-26 NOTE — PROGRESS NOTES
Hospital Medicine Daily Progress Note    Date of Service  11/26/2022    Chief Complaint  Hernán Walters is a 77 y.o. male admitted 11/18/2022 with N/V and abdominal pain    Hospital Course  Hernán Walters is a 77 y.o. male who presented 11/18/2022 with abdominal pain.  Patient states his pain initially started approximately a week ago however has been worsening.  He states its in his entire abdomen, sharp at times, can be severe.  He also states he began having nausea and vomiting and can no longer keep anything down.  Because of the ongoing nature of this and the worsening nature, he presented to the ER at an outlying facility.  There they did imaging and noted a small bowel obstruction.  Patient was transferred here for higher level of care.  Patient noted no bleeding.  He does have a history of appendectomy and hemicolectomy approximately 4 years ago.  Patient states he did have a tooth pulled and has been antibiotics on this for approximately 7 days.  He was transferred from outside ER to here at [facility] for higher level of care.      S/p ex lap OR report shows volvulus with perforation, required resection and anastomosis with Dr. Shultz.    Interval Problem Update  Patient stated he feels better, passed larger long gas.   NGT removed today. Pt tolerating clear liquid diet  Gen sx following. Wife at bedside, gave updates.    I have discussed this patient's plan of care and discharge plan at IDT rounds today with Case Management, Nursing, Nursing leadership, and other members of the IDT team.    Consultants/Specialty  general surgery (Dr. Osborne, Dr. Shultz, Dr. Ziegler)    Code Status  Full Code    Disposition  Patient is not medically cleared for discharge.   Anticipate discharge to  TBD .  I have placed the appropriate orders for post-discharge needs.    Review of Systems  Review of Systems   Constitutional:  Negative for fever and malaise/fatigue.   HENT:  Negative for congestion and hearing loss.    Eyes:   Negative for blurred vision and pain.   Respiratory:  Negative for cough and shortness of breath.    Gastrointestinal:  Negative for abdominal pain, nausea and vomiting.   Genitourinary:  Negative for dysuria and hematuria.   Neurological:  Negative for dizziness and headaches.   Psychiatric/Behavioral:  Negative for memory loss. The patient is not nervous/anxious and does not have insomnia.    All other systems reviewed and are negative.     Physical Exam  Temp:  [36.2 °C (97.2 °F)-37.4 °C (99.4 °F)] 36.2 °C (97.2 °F)  Pulse:  [82-87] 84  Resp:  [16-18] 16  BP: (115-132)/(75-84) 115/75  SpO2:  [91 %-92 %] 92 %    Physical Exam  Vitals and nursing note reviewed.   Constitutional:       General: He is not in acute distress.     Appearance: He is not ill-appearing.   HENT:      Head: Normocephalic and atraumatic.      Mouth/Throat:      Mouth: Mucous membranes are dry.      Pharynx: No oropharyngeal exudate.   Eyes:      General: No scleral icterus.     Extraocular Movements: Extraocular movements intact.   Cardiovascular:      Rate and Rhythm: Normal rate and regular rhythm.      Pulses: Normal pulses.      Heart sounds: Normal heart sounds. No murmur heard.  Pulmonary:      Effort: Pulmonary effort is normal. No respiratory distress.      Breath sounds: Normal breath sounds.   Abdominal:      General: Bowel sounds are normal. There is no distension.      Tenderness: There is abdominal tenderness.      Comments: Midline surgical sites, covered with guaze, some blood seen. Staples intact.   Musculoskeletal:         General: No swelling or tenderness. Normal range of motion.   Skin:     General: Skin is warm.      Capillary Refill: Capillary refill takes less than 2 seconds.      Coloration: Skin is not jaundiced or pale.   Neurological:      Mental Status: He is alert and oriented to person, place, and time. Mental status is at baseline.      Motor: No weakness.   Psychiatric:         Mood and Affect: Mood normal.          Behavior: Behavior normal.         Thought Content: Thought content normal.         Judgment: Judgment normal.       Fluids    Intake/Output Summary (Last 24 hours) at 11/26/2022 1409  Last data filed at 11/26/2022 1400  Gross per 24 hour   Intake 820 ml   Output 400 ml   Net 420 ml       Laboratory  Recent Labs     11/24/22  0630 11/25/22  0328 11/26/22  0642   WBC 14.8* 9.8 12.9*   RBC 4.44* 4.24* 4.24*   HEMOGLOBIN 14.5 14.1 13.7*   HEMATOCRIT 43.3 42.0 42.0   MCV 97.5 99.1* 99.1*   MCH 32.7 33.3* 32.3   MCHC 33.5* 33.6* 32.6*   RDW 45.3 46.3 46.3   PLATELETCT 245 244 269   MPV 9.8 10.3 10.3     Recent Labs     11/24/22  0231 11/25/22  0328 11/26/22  0642   SODIUM 147* 145 148*   POTASSIUM 4.3 3.2* 3.6   CHLORIDE 111 113* 115*   CO2 23 23 20   GLUCOSE 136* 107* 89   BUN 27* 24* 26*   CREATININE 1.37 1.05 0.82   CALCIUM 7.6* 7.4* 7.8*     Recent Labs     11/24/22  0231   APTT 21.8*   INR 1.21*               Imaging  DX-CHEST-PORTABLE (1 VIEW)   Final Result      1.  RIGHT lung base infiltrate or atelectasis, unchanged.   2.  No pleural fluid or pneumothorax.   3.  Supportive tubing as described above.      DX-ABDOMEN FOR TUBE PLACEMENT   Final Result      NG tube terminates over the stomach      Similar gaseous distention of small bowel loops concerning for distal obstruction      DX-ABDOMEN FOR TUBE PLACEMENT   Final Result      NG tube has been advanced and now terminates over the gastric fundus in good position      DX-ABDOMEN FOR TUBE PLACEMENT   Final Result      NG tube terminates abnormally high, over the distal esophagus and there is continued gaseous distention of bowel loops      Voalte sent to and replied by HERNANDO RAMOS on 11/21/2022 1:36 PM.      DX-SMALL BOWEL SERIES   Final Result      1.  Difficult to definitively identify contrast within the colon 24 hour images due to dilution of contrast and suction of contrast from stomach.      2.  There is persistent dilated small bowel suggesting small  bowel obstruction. Consider CT scan for further evaluation.      DX-ABDOMEN FOR TUBE PLACEMENT   Final Result      Enteric tube has been placed and the tip projects over the stomach.      [id number] VIEW   Final Result      1.  Dilated loops of small bowel consistent with small bowel obstruction.             Assessment/Plan  * SBO (small bowel obstruction) (HCC)- (present on admission)  Assessment & Plan  Transferred from [facility] for SBO    S/p ex lap with Dr. Shultz on 11/23  -OR report shows volvulus with perforation, required resection and anastomosis    Today NGT removed. 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 Shultz on 11/23.  Caused patient's SBO on admission  WBC increased again. Continue IV ceftriaxone and IV flagyl.  NGT removed today. Pt tolerating clear liquid diet    Hypophosphatemia  Assessment & Plan  1.8, pt is npo  Replacing via IV with potassium  Recheck daily    2.5 stable today  When patient is able to resume diet, high concern for potential Refeeding Syndrome    Hypomagnesemia  Assessment & Plan  1.8, pt npo  Replacing via IV  Recheck labs daily  Improving 1.9, monitoring. Replace via PO as patient is on psychiatric medications.    Hypokalemia  Assessment & Plan  Due to GI condition and suction  Recheck lab daily  Improved, monitor    Leukocytosis- (present on admission)  Assessment & Plan  - increased today after surgery and pt remains afebrile.  - OR report shows volvulus with perforation, required resection and anastomosis with Dr. Shultz.  - continue ceftriaxone and flagyl, transition to PO ABX prior to discharge. WBC remains elevated.    Dyslipidemia- (present on admission)  Assessment & Plan  - Restart home statin at home    Anxiety- (present on admission)  Assessment & Plan  - Is on Klonopin twice a day, patient is unsure  whether that is as needed or scheduled  -restarting home klonopin today    Seizure disorder (HCC)- (present on admission)  Assessment & Plan  Restarting home depakote       VTE prophylaxis: heparin ppx    I have performed a physical exam and reviewed and updated ROS and Plan today (11/26/2022). In review of yesterday's note (11/25/2022), there are no changes except as documented above.

## 2022-11-26 NOTE — PROGRESS NOTES
Pt continues with NGT at low intermittent suction with light green fluid suctioning from tube. Pt has yet to pass flatus.

## 2022-11-26 NOTE — PROGRESS NOTES
Received report from day shift RN. Assumed care of pt. Pt A&O X 4, on RA. Pt denies pain or nausea at this time. Pt continued with NGT on low intermittent  suction, light green output noted. Pt updated with POC which includes IV abx, fall precautions in place, call light within reach. All needs met at this time.

## 2022-11-26 NOTE — CARE PLAN
The patient is Stable - Low risk of patient condition declining or worsening    Shift Goals  Clinical Goals: Pt will report passing gas during the shift; monitor NGT output  Patient Goals: Pt will rest and sleep comfortably tonight  Family Goals: NA    Progress made toward(s) clinical / shift goals:  Pt reports passing flatus overnight; no report of N/V. NGT on intermittent low suction with bilious output. Pt with wet productive cough and copious thick sputum overnight, Yankauer suction provided. Progressing in other goals.      Problem: Knowledge Deficit - Standard  Goal: Patient and family/care givers will demonstrate understanding of plan of care, disease process/condition, diagnostic tests and medications  Outcome: Progressing     Problem: Skin Integrity  Goal: Skin integrity is maintained or improved  Outcome: Progressing     Problem: Fall Risk  Goal: Patient will remain free from falls  Outcome: Progressing     Problem: Bowel Elimination  Goal: Establish and maintain regular bowel function  Outcome: Progressing    Patient is not progressing towards the following goals: NA

## 2022-11-26 NOTE — PROGRESS NOTES
Received report from NOC RN. Pt is A&O 4. VSS. Pt has no complaints of N/V as of assessment. No signs of respiratory distress noted. NG tube in place and draining well. Safety precaution in place. All needs met at this time.

## 2022-11-26 NOTE — PROGRESS NOTES
Surgical Progress Note      Interval Events:  77-year-old male status post exploratory laparotomy and small bowel resection on 2022 for internal volvulus with necrotic small bowel and perforation identified    Patient's distention has resolved.  He reports passing flatus now.  NG tube only put out 100 cc yesterday.  He has very little abdominal pain, only on his incision        ROS  Hemodynamics:  Temp (24hrs), Av.1 °C (98.8 °F), Min:36.6 °C (97.8 °F), Max:37.4 °C (99.4 °F)  Temperature: 37.1 °C (98.7 °F)  Pulse  Av.4  Min: 60  Max: 104   Blood Pressure : 129/75     Respiratory:    Respiration: 18, Pulse Oximetry: 92 %        RUL Breath Sounds: Crackles, RML Breath Sounds: Crackles, RLL Breath Sounds: Diminished, LILIYA Breath Sounds: Crackles, LLL Breath Sounds: Diminished  Neuro:  GCS       Fluids:    Intake/Output Summary (Last 24 hours) at 2022 1028  Last data filed at 2022 0827  Gross per 24 hour   Intake 1000 ml   Output 925 ml   Net 75 ml        Current Diet Order   Procedures    Diet NPO Restrict to: Ice Chips       Physical Exam  No acute distress, appears comfortable  NG tube in place  Abdomen soft, incision dressing clean dry and intact      Labs:  Recent Results (from the past 24 hour(s))   Renal Function Panel    Collection Time: 22  6:42 AM   Result Value Ref Range    Sodium 148 (H) 135 - 145 mmol/L    Potassium 3.6 3.6 - 5.5 mmol/L    Chloride 115 (H) 96 - 112 mmol/L    Co2 20 20 - 33 mmol/L    Glucose 89 65 - 99 mg/dL    Creatinine 0.82 0.50 - 1.40 mg/dL    Bun 26 (H) 8 - 22 mg/dL    Calcium 7.8 (L) 8.4 - 10.2 mg/dL    Phosphorus 2.5 2.5 - 4.5 mg/dL    Albumin 2.4 (L) 3.2 - 4.9 g/dL   MAGNESIUM    Collection Time: 22  6:42 AM   Result Value Ref Range    Magnesium 1.9 1.5 - 2.5 mg/dL   CBC WITHOUT DIFFERENTIAL    Collection Time: 22  6:42 AM   Result Value Ref Range    WBC 12.9 (H) 4.8 - 10.8 K/uL    RBC 4.24 (L) 4.70 - 6.10 M/uL    Hemoglobin 13.7 (L) 14.0 -  18.0 g/dL    Hematocrit 42.0 42.0 - 52.0 %    MCV 99.1 (H) 81.4 - 97.8 fL    MCH 32.3 27.0 - 33.0 pg    MCHC 32.6 (L) 33.7 - 35.3 g/dL    RDW 46.3 35.9 - 50.0 fL    Platelet Count 269 164 - 446 K/uL    MPV 10.3 9.0 - 12.9 fL   PROCALCITONIN    Collection Time: 11/26/22  6:42 AM   Result Value Ref Range    Procalcitonin 2.56 (H) <0.25 ng/mL   ESTIMATED GFR    Collection Time: 11/26/22  6:42 AM   Result Value Ref Range    GFR (CKD-EPI) 91 >60 mL/min/1.73 m 2     Medical Decision Making, by Problem:  Active Hospital Problems    Diagnosis     Hypophosphatemia [E83.39]     Volvulus of intestine (HCC) [K56.2]     Hypomagnesemia [E83.42]     Hypokalemia [E87.6]     SBO (small bowel obstruction) (HCC) [K56.609]     Seizure disorder (HCC) [G40.909]     Anxiety [F41.9]     Dyslipidemia [E78.5]     Leukocytosis [D72.829]      Plan:  77-year-old male postop day 1 from exploratory laparotomy and small bowel resection for 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  Appreciate medical care  2.  As needed analgesia  3.  DVT prophylaxis  4.  DC NGT   5.  Clear liquid diet this morning, if patient is hungry for dinner he can have GI soft food  6.  Will follow closely    Quality Measures:  Quality-Core Measures    Discussed patient condition with Patient

## 2022-11-27 ENCOUNTER — APPOINTMENT (OUTPATIENT)
Dept: RADIOLOGY | Facility: MEDICAL CENTER | Age: 77
DRG: 329 | End: 2022-11-27
Attending: INTERNAL MEDICINE
Payer: MEDICARE

## 2022-11-27 PROBLEM — J69.0 ASPIRATION PNEUMONIA OF RIGHT LOWER LOBE DUE TO GASTRIC SECRETIONS (HCC): Status: ACTIVE | Noted: 2022-11-27

## 2022-11-27 LAB
ALBUMIN SERPL BCP-MCNC: 2.1 G/DL (ref 3.2–4.9)
BUN SERPL-MCNC: 24 MG/DL (ref 8–22)
CALCIUM SERPL-MCNC: 7.3 MG/DL (ref 8.4–10.2)
CHLORIDE SERPL-SCNC: 113 MMOL/L (ref 96–112)
CO2 SERPL-SCNC: 22 MMOL/L (ref 20–33)
CREAT SERPL-MCNC: 0.76 MG/DL (ref 0.5–1.4)
ERYTHROCYTE [DISTWIDTH] IN BLOOD BY AUTOMATED COUNT: 46.1 FL (ref 35.9–50)
GFR SERPLBLD CREATININE-BSD FMLA CKD-EPI: 93 ML/MIN/1.73 M 2
GLUCOSE SERPL-MCNC: 101 MG/DL (ref 65–99)
HCT VFR BLD AUTO: 39.2 % (ref 42–52)
HGB BLD-MCNC: 13.1 G/DL (ref 14–18)
MAGNESIUM SERPL-MCNC: 1.8 MG/DL (ref 1.5–2.5)
MCH RBC QN AUTO: 33.1 PG (ref 27–33)
MCHC RBC AUTO-ENTMCNC: 33.4 G/DL (ref 33.7–35.3)
MCV RBC AUTO: 99 FL (ref 81.4–97.8)
NT-PROBNP SERPL IA-MCNC: 453 PG/ML (ref 0–125)
PHOSPHATE SERPL-MCNC: 2.4 MG/DL (ref 2.5–4.5)
PLATELET # BLD AUTO: 283 K/UL (ref 164–446)
PMV BLD AUTO: 10.1 FL (ref 9–12.9)
POTASSIUM SERPL-SCNC: 3.5 MMOL/L (ref 3.6–5.5)
RBC # BLD AUTO: 3.96 M/UL (ref 4.7–6.1)
SODIUM SERPL-SCNC: 144 MMOL/L (ref 135–145)
WBC # BLD AUTO: 13.2 K/UL (ref 4.8–10.8)

## 2022-11-27 PROCEDURE — 700101 HCHG RX REV CODE 250: Performed by: INTERNAL MEDICINE

## 2022-11-27 PROCEDURE — 36415 COLL VENOUS BLD VENIPUNCTURE: CPT

## 2022-11-27 PROCEDURE — 83735 ASSAY OF MAGNESIUM: CPT

## 2022-11-27 PROCEDURE — 85027 COMPLETE CBC AUTOMATED: CPT

## 2022-11-27 PROCEDURE — 700102 HCHG RX REV CODE 250 W/ 637 OVERRIDE(OP): Performed by: INTERNAL MEDICINE

## 2022-11-27 PROCEDURE — 94760 N-INVAS EAR/PLS OXIMETRY 1: CPT

## 2022-11-27 PROCEDURE — 770006 HCHG ROOM/CARE - MED/SURG/GYN SEMI*

## 2022-11-27 PROCEDURE — 83880 ASSAY OF NATRIURETIC PEPTIDE: CPT

## 2022-11-27 PROCEDURE — 700111 HCHG RX REV CODE 636 W/ 250 OVERRIDE (IP): Performed by: INTERNAL MEDICINE

## 2022-11-27 PROCEDURE — A9270 NON-COVERED ITEM OR SERVICE: HCPCS | Performed by: INTERNAL MEDICINE

## 2022-11-27 PROCEDURE — 99233 SBSQ HOSP IP/OBS HIGH 50: CPT | Performed by: INTERNAL MEDICINE

## 2022-11-27 PROCEDURE — 700105 HCHG RX REV CODE 258: Performed by: INTERNAL MEDICINE

## 2022-11-27 PROCEDURE — 71045 X-RAY EXAM CHEST 1 VIEW: CPT

## 2022-11-27 PROCEDURE — 80069 RENAL FUNCTION PANEL: CPT

## 2022-11-27 RX ORDER — METRONIDAZOLE 500 MG/1
500 TABLET ORAL EVERY 8 HOURS
Status: DISCONTINUED | OUTPATIENT
Start: 2022-11-27 | End: 2022-11-28

## 2022-11-27 RX ORDER — MAGNESIUM SULFATE 1 G/100ML
1 INJECTION INTRAVENOUS ONCE
Status: COMPLETED | OUTPATIENT
Start: 2022-11-27 | End: 2022-11-27

## 2022-11-27 RX ADMIN — CLONAZEPAM 0.5 MG: 0.5 TABLET ORAL at 17:58

## 2022-11-27 RX ADMIN — Medication 400 MG: at 04:56

## 2022-11-27 RX ADMIN — POTASSIUM BICARBONATE 25 MEQ: 978 TABLET, EFFERVESCENT ORAL at 08:11

## 2022-11-27 RX ADMIN — HEPARIN SODIUM 5000 UNITS: 5000 INJECTION, SOLUTION INTRAVENOUS; SUBCUTANEOUS at 14:03

## 2022-11-27 RX ADMIN — METRONIDAZOLE 500 MG: 500 INJECTION, SOLUTION INTRAVENOUS at 06:08

## 2022-11-27 RX ADMIN — HEPARIN SODIUM 5000 UNITS: 5000 INJECTION, SOLUTION INTRAVENOUS; SUBCUTANEOUS at 22:21

## 2022-11-27 RX ADMIN — Medication 400 MG: at 17:58

## 2022-11-27 RX ADMIN — DIVALPROEX SODIUM 250 MG: 250 TABLET, DELAYED RELEASE ORAL at 04:56

## 2022-11-27 RX ADMIN — METRONIDAZOLE 500 MG: 500 TABLET ORAL at 22:21

## 2022-11-27 RX ADMIN — CEFTRIAXONE SODIUM 2000 MG: 2 INJECTION, POWDER, FOR SOLUTION INTRAMUSCULAR; INTRAVENOUS at 04:54

## 2022-11-27 RX ADMIN — METRONIDAZOLE 500 MG: 500 TABLET ORAL at 14:03

## 2022-11-27 RX ADMIN — MAGNESIUM SULFATE HEPTAHYDRATE 1 G: 1 INJECTION, SOLUTION INTRAVENOUS at 08:16

## 2022-11-27 RX ADMIN — CLONAZEPAM 0.5 MG: 0.5 TABLET ORAL at 04:56

## 2022-11-27 ASSESSMENT — ENCOUNTER SYMPTOMS
HEADACHES: 0
NAUSEA: 0
VOMITING: 0
FEVER: 0
SHORTNESS OF BREATH: 0
BLURRED VISION: 0
ABDOMINAL PAIN: 0
EYE PAIN: 0
INSOMNIA: 0
MEMORY LOSS: 0
COUGH: 0
DIZZINESS: 0
NERVOUS/ANXIOUS: 0

## 2022-11-27 ASSESSMENT — PATIENT HEALTH QUESTIONNAIRE - PHQ9
SUM OF ALL RESPONSES TO PHQ9 QUESTIONS 1 AND 2: 1
2. FEELING DOWN, DEPRESSED, IRRITABLE, OR HOPELESS: SEVERAL DAYS
3. TROUBLE FALLING OR STAYING ASLEEP OR SLEEPING TOO MUCH: NOT AT ALL
1. LITTLE INTEREST OR PLEASURE IN DOING THINGS: NOT AT ALL
4. FEELING TIRED OR HAVING LITTLE ENERGY: SEVERAL DAYS
7. TROUBLE CONCENTRATING ON THINGS, SUCH AS READING THE NEWSPAPER OR WATCHING TELEVISION: NOT AT ALL
6. FEELING BAD ABOUT YOURSELF - OR THAT YOU ARE A FAILURE OR HAVE LET YOURSELF OR YOUR FAMILY DOWN: NOT AL ALL
5. POOR APPETITE OR OVEREATING: NOT AT ALL
8. MOVING OR SPEAKING SO SLOWLY THAT OTHER PEOPLE COULD HAVE NOTICED. OR THE OPPOSITE, BEING SO FIGETY OR RESTLESS THAT YOU HAVE BEEN MOVING AROUND A LOT MORE THAN USUAL: NOT AT ALL

## 2022-11-27 ASSESSMENT — PAIN DESCRIPTION - PAIN TYPE: TYPE: SURGICAL PAIN

## 2022-11-27 NOTE — PROGRESS NOTES
Surgical Progress Note      Interval Events:  77-year-old male status post exploratory laparotomy and small bowel resection on 2022 for internal volvulus with necrotic small bowel and perforation identified    Patient feels great today he had a large bowel movement morning.  No longer feels distended.  He tolerated liquid diet yesterday during the day had a GI soft diet for dinner without any nausea.  His pain is well controlled        ROS  Hemodynamics:  Temp (24hrs), Av.7 °C (98.1 °F), Min:36.6 °C (97.8 °F), Max:36.9 °C (98.5 °F)  Temperature: 36.6 °C (97.8 °F)  Pulse  Av  Min: 60  Max: 104   Blood Pressure : 103/74     Respiratory:    Respiration: 16, Pulse Oximetry: 93 %        RUL Breath Sounds: Crackles, RML Breath Sounds: Crackles, RLL Breath Sounds: Diminished, LILIYA Breath Sounds: Crackles, LLL Breath Sounds: Diminished  Neuro:  GCS       Fluids:    Intake/Output Summary (Last 24 hours) at 2022 1028  Last data filed at 2022 0827  Gross per 24 hour   Intake 1000 ml   Output 925 ml   Net 75 ml        Current Diet Order   Procedures    Diet Order Diet: Level 6 - Soft and Bite Sized (no red meat, low fiber); Liquid level: Level 0 - Thin       Physical Exam  No acute distress, appears comfortable  Abdomen soft, incision dressing clean dry and intact      Labs:  Recent Results (from the past 24 hour(s))   Renal Function Panel    Collection Time: 22  3:15 AM   Result Value Ref Range    Sodium 144 135 - 145 mmol/L    Potassium 3.5 (L) 3.6 - 5.5 mmol/L    Chloride 113 (H) 96 - 112 mmol/L    Co2 22 20 - 33 mmol/L    Glucose 101 (H) 65 - 99 mg/dL    Creatinine 0.76 0.50 - 1.40 mg/dL    Bun 24 (H) 8 - 22 mg/dL    Calcium 7.3 (L) 8.4 - 10.2 mg/dL    Phosphorus 2.4 (L) 2.5 - 4.5 mg/dL    Albumin 2.1 (L) 3.2 - 4.9 g/dL   MAGNESIUM    Collection Time: 22  3:15 AM   Result Value Ref Range    Magnesium 1.8 1.5 - 2.5 mg/dL   CBC WITHOUT DIFFERENTIAL    Collection Time: 22  3:15 AM    Result Value Ref Range    WBC 13.2 (H) 4.8 - 10.8 K/uL    RBC 3.96 (L) 4.70 - 6.10 M/uL    Hemoglobin 13.1 (L) 14.0 - 18.0 g/dL    Hematocrit 39.2 (L) 42.0 - 52.0 %    MCV 99.0 (H) 81.4 - 97.8 fL    MCH 33.1 (H) 27.0 - 33.0 pg    MCHC 33.4 (L) 33.7 - 35.3 g/dL    RDW 46.1 35.9 - 50.0 fL    Platelet Count 283 164 - 446 K/uL    MPV 10.1 9.0 - 12.9 fL   ESTIMATED GFR    Collection Time: 11/27/22  3:15 AM   Result Value Ref Range    GFR (CKD-EPI) 93 >60 mL/min/1.73 m 2   proBrain Natriuretic Peptide, NT    Collection Time: 11/27/22  3:15 AM   Result Value Ref Range    NT-proBNP 453 (H) 0 - 125 pg/mL     Medical Decision Making, by Problem:  Active Hospital Problems    Diagnosis     Aspiration pneumonia of right lower lobe due to gastric secretions (HCC) [J69.0]     Hypophosphatemia [E83.39]     Volvulus of intestine (HCC) [K56.2]     Hypomagnesemia [E83.42]     Hypokalemia [E87.6]     SBO (small bowel obstruction) (HCC) [K56.609]     Seizure disorder (HCC) [G40.909]     Anxiety [F41.9]     Dyslipidemia [E78.5]     Leukocytosis [D72.829]      Plan:  77-year-old male postop day 4 from exploratory laparotomy and small bowel resection for 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  Appreciate medical care  2.  As needed analgesia  3.  DVT prophylaxis  4.  GI soft diet  5.  DC planning  6.  Follow-up with Dr. Shultz in approximately 1 to 2 weeks  7.  No heavy lifting (less than 15 pounds) for 6 weeks  8.  Patient may need PT/OT evaluation recommendations before discharge    Quality Measures:  Quality-Core Measures    Discussed patient condition with Patient

## 2022-11-27 NOTE — CARE PLAN
The patient is Watcher - Medium risk of patient condition declining or worsening - discussed pt's care with hospitalist.     Shift Goals  Clinical Goals: NGT out, diet started  Patient Goals: NGT out, diet started  Family Goals: NA    Progress made toward(s) clinical / shift goals: NGT removed, diet started.       Problem: Knowledge Deficit - Standard  Goal: Patient and family/care givers will demonstrate understanding of plan of care, disease process/condition, diagnostic tests and medications  Outcome: Progressing     Problem: Skin Integrity  Goal: Skin integrity is maintained or improved  Outcome: Progressing     Problem: Psychosocial  Goal: Patient's level of anxiety will decrease  Outcome: Progressing     Problem: Communication  Goal: The ability to communicate needs accurately and effectively will improve  Outcome: Progressing     Problem: Bowel Elimination  Goal: Establish and maintain regular bowel function  Outcome: Progressing     Problem: Pain - Standard  Goal: Alleviation of pain or a reduction in pain to the patient’s comfort goal  Outcome: Progressing     Problem: Fall Risk  Goal: Patient will remain free from falls  Outcome: Progressing

## 2022-11-27 NOTE — CARE PLAN
The patient is Stable - Low risk of patient condition declining or worsening    Shift Goals  Clinical Goals: Pt will tolerate GI soft diet with no S/S of N/V overnight; continue IV abx therapy  Patient Goals: Pt will sleep and rest comfortably  Family Goals: NA    Progress made toward(s) clinical / shift goals:  Pt tolerated diet with no complaints of N/V overnight; Pt didn't have a BM but is passing gas. IV abx therapy continued.    Problem: Knowledge Deficit - Standard  Goal: Patient and family/care givers will demonstrate understanding of plan of care, disease process/condition, diagnostic tests and medications  Outcome: Progressing     Problem: Psychosocial  Goal: Patient's level of anxiety will decrease  Outcome: Progressing     Problem: Bowel Elimination  Goal: Establish and maintain regular bowel function  Outcome: Progressing     Problem: Nutrition  Goal: Patient's nutritional and fluid intake will be adequate or improve  Outcome: Progressing     Problem: Fall Risk  Goal: Patient will remain free from falls  Outcome: Progressing       Patient is not progressing towards the following goals: NA

## 2022-11-27 NOTE — ASSESSMENT & PLAN NOTE
CXR obtained today reviewed and compared to prior CXR on 11/25, appears to be right lower lobe pneumonia likely aspiration pna as WBC and procalcitonin remain elevated  Due to patient's gastric secretion aspiration, he did pull his NGT 4 times early in his hospital course  New problem  - ABX changed to Augmentin

## 2022-11-27 NOTE — PROGRESS NOTES
Received report from day shift RN. Pt A&O X 4, on RA, resting comfortably in bed. Pt  reports 1/10 abd pain at this time. Pt denies N/V, is passing gas and tolerating GI soft diet. Pt updated with POC which includes IV abx, fall precautions in place, call light within reach. All needs met at this time.

## 2022-11-27 NOTE — PROGRESS NOTES
[facility] Medicine Daily Progress Note    Date of Service  11/27/2022    Chief Complaint  Hernán Walters is a 77 y.o. male admitted 11/18/2022 with N/V and abdominal pain    Hospital Course  Hernán Walters is a 77 y.o. male who presented 11/18/2022 with abdominal pain.  Patient states his pain initially started approximately a week ago however has been worsening.  He states its in his entire abdomen, sharp at times, can be severe.  He also states he began having nausea and vomiting and can no longer keep anything down.  Because of the ongoing nature of this and the worsening nature, he presented to the ER at an outlying facility.  There they did imaging and noted a small bowel obstruction.  Patient was transferred here for higher level of care.  Patient noted no bleeding.  He does have a history of appendectomy and hemicolectomy approximately 4 years ago.  Patient states he did have a tooth pulled and has been antibiotics on this for approximately 7 days.  He was transferred from outside ER to here at [facility] for higher level of care.      S/p ex lap OR report shows volvulus with perforation, required resection and anastomosis with Dr. Shultz.    NGT removed on 11/26/22.    Interval Problem Update  Patient stated he feels better, passed larger long gas.   Gen sx following.  WBC 13.2, procal 2.56. Continue IV Ceftriaxone and PO Flagyl.  CXR reviewed, appears to be right lower lobe pneumonia likely aspiration PNA    I have discussed this patient's plan of care and discharge plan at IDT rounds today with Case Management, Nursing, Nursing leadership, and other members of the IDT team.    Consultants/Specialty  general surgery (Dr. Osborne, Dr. Shultz, Dr. Ziegler)    Code Status  Full Code    Disposition  Patient is not medically cleared for discharge.   Anticipate discharge to  TBD .  I have placed the appropriate orders for post-discharge needs.    Review of Systems  Review of Systems   Constitutional:  Negative for fever and  malaise/fatigue.   HENT:  Negative for congestion and hearing loss.    Eyes:  Negative for blurred vision and pain.   Respiratory:  Negative for cough and shortness of breath.    Gastrointestinal:  Negative for abdominal pain, nausea and vomiting.   Genitourinary:  Negative for dysuria and hematuria.   Neurological:  Negative for dizziness and headaches.   Psychiatric/Behavioral:  Negative for memory loss. The patient is not nervous/anxious and does not have insomnia.    All other systems reviewed and are negative.     Physical Exam  Temp:  [36.6 °C (97.8 °F)-36.9 °C (98.5 °F)] 36.6 °C (97.8 °F)  Pulse:  [72-91] 91  Resp:  [16-18] 16  BP: (103-125)/(73-76) 103/74  SpO2:  [92 %-93 %] 93 %    Physical Exam  Vitals and nursing note reviewed.   Constitutional:       General: He is not in acute distress.     Appearance: He is not ill-appearing.   HENT:      Head: Normocephalic and atraumatic.      Mouth/Throat:      Mouth: Mucous membranes are dry.      Pharynx: No oropharyngeal exudate.   Eyes:      General: No scleral icterus.     Extraocular Movements: Extraocular movements intact.   Cardiovascular:      Rate and Rhythm: Normal rate and regular rhythm.      Pulses: Normal pulses.      Heart sounds: Normal heart sounds. No murmur heard.  Pulmonary:      Effort: Pulmonary effort is normal. No respiratory distress.      Breath sounds: Rhonchi (bilateral, R>L) present.   Abdominal:      General: Bowel sounds are normal. There is no distension.      Tenderness: There is abdominal tenderness.      Comments: Midline surgical sites, covered with guaze, some blood seen. Staples intact.   Musculoskeletal:         General: No swelling or tenderness. Normal range of motion.   Skin:     General: Skin is warm.      Capillary Refill: Capillary refill takes less than 2 seconds.      Coloration: Skin is not jaundiced or pale.   Neurological:      Mental Status: He is alert and oriented to person, place, and time. Mental status is at  baseline.      Motor: No weakness.   Psychiatric:         Mood and Affect: Mood normal.         Behavior: Behavior normal.         Thought Content: Thought content normal.         Judgment: Judgment normal.       Fluids    Intake/Output Summary (Last 24 hours) at 11/27/2022 1254  Last data filed at 11/27/2022 1015  Gross per 24 hour   Intake 1758.9 ml   Output 800 ml   Net 958.9 ml       Laboratory  Recent Labs     11/25/22 0328 11/26/22 0642 11/27/22  0315   WBC 9.8 12.9* 13.2*   RBC 4.24* 4.24* 3.96*   HEMOGLOBIN 14.1 13.7* 13.1*   HEMATOCRIT 42.0 42.0 39.2*   MCV 99.1* 99.1* 99.0*   MCH 33.3* 32.3 33.1*   MCHC 33.6* 32.6* 33.4*   RDW 46.3 46.3 46.1   PLATELETCT 244 269 283   MPV 10.3 10.3 10.1     Recent Labs     11/25/22 0328 11/26/22 0642 11/27/22 0315   SODIUM 145 148* 144   POTASSIUM 3.2* 3.6 3.5*   CHLORIDE 113* 115* 113*   CO2 23 20 22   GLUCOSE 107* 89 101*   BUN 24* 26* 24*   CREATININE 1.05 0.82 0.76   CALCIUM 7.4* 7.8* 7.3*                     Imaging  DX-CHEST-PORTABLE (1 VIEW)   Final Result         Interval removal of gastric drainage tube.      Worsening right basilar atelectasis or infection.      DX-CHEST-PORTABLE (1 VIEW)   Final Result      1.  RIGHT lung base infiltrate or atelectasis, unchanged.   2.  No pleural fluid or pneumothorax.   3.  Supportive tubing as described above.      DX-ABDOMEN FOR TUBE PLACEMENT   Final Result      NG tube terminates over the stomach      Similar gaseous distention of small bowel loops concerning for distal obstruction      DX-ABDOMEN FOR TUBE PLACEMENT   Final Result      NG tube has been advanced and now terminates over the gastric fundus in good position      DX-ABDOMEN FOR TUBE PLACEMENT   Final Result      NG tube terminates abnormally high, over the distal esophagus and there is continued gaseous distention of bowel loops      Voalte sent to and replied by HERNANDO RAMOS on 11/21/2022 1:36 PM.      DX-SMALL BOWEL SERIES   Final Result      1.   Difficult to definitively identify contrast within the colon 24 hour images due to dilution of contrast and suction of contrast from stomach.      2.  There is persistent dilated small bowel suggesting small bowel obstruction. Consider CT scan for further evaluation.      DX-ABDOMEN FOR TUBE PLACEMENT   Final Result      Enteric tube has been placed and the tip projects over the stomach.      [id number] VIEW   Final Result      1.  Dilated loops of small bowel consistent with small bowel obstruction.             Assessment/Plan  * SBO (small bowel obstruction) (HCC)- (present on admission)  Assessment & Plan  Transferred from [facility] for SBO    S/p ex lap with Dr. Shultz on 11/23  -OR report shows volvulus with perforation, required resection and anastomosis    Today NGT removed. 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 Shultz on 11/23.  Caused patient's SBO on admission  WBC increased again. Continue IV ceftriaxone and IV flagyl.  NGT removed today. Pt tolerating clear liquid diet    Hypophosphatemia  Assessment & Plan  1.8, pt is npo  Replacing via IV with potassium  Recheck daily    2.5 stable today  When patient is able to resume diet, high concern for potential Refeeding Syndrome    Hypomagnesemia  Assessment & Plan  1.8, pt npo  Replacing via IV  Recheck labs daily  Improving 1.9, monitoring. Replace via  PO as patient is on psychiatric medications.    Hypokalemia  Assessment & Plan  Due to GI condition and suction  Recheck lab daily  Improved, monitor    Leukocytosis- (present on admission)  Assessment & Plan  - increased today after surgery and pt remains afebrile.  - OR report shows volvulus with perforation, required resection and anastomosis with Dr. Shultz.  - continue ceftriaxone and flagyl, transition to PO ABX prior to discharge. WBC remains elevated.  CXR reviewed, appears to be right lower lobe pneumonia likely aspiration pna    Dyslipidemia- (present on admission)  Assessment & Plan  - Restart home statin at home    Anxiety- (present on admission)  Assessment & Plan  - Is on Klonopin twice a day, patient is unsure whether that is as needed or scheduled  -restarting home klonopin today    Seizure disorder (HCC)- (present on admission)  Assessment & Plan  Restarting home depakote       VTE prophylaxis: heparin ppx    I have performed a physical exam and reviewed and updated ROS and Plan today (11/27/2022). In review of yesterday's note (11/26/2022), there are no changes except as documented above.

## 2022-11-28 VITALS
BODY MASS INDEX: 20.28 KG/M2 | WEIGHT: 133.82 LBS | TEMPERATURE: 97.7 F | OXYGEN SATURATION: 94 % | SYSTOLIC BLOOD PRESSURE: 89 MMHG | DIASTOLIC BLOOD PRESSURE: 60 MMHG | HEART RATE: 90 BPM | HEIGHT: 68 IN | RESPIRATION RATE: 18 BRPM

## 2022-11-28 PROBLEM — E83.42 HYPOMAGNESEMIA: Status: RESOLVED | Noted: 2022-11-24 | Resolved: 2022-11-28

## 2022-11-28 PROBLEM — E87.6 HYPOKALEMIA: Status: RESOLVED | Noted: 2022-11-23 | Resolved: 2022-11-28

## 2022-11-28 PROBLEM — E83.39 HYPOPHOSPHATEMIA: Status: RESOLVED | Noted: 2022-11-25 | Resolved: 2022-11-28

## 2022-11-28 PROBLEM — K56.609 SBO (SMALL BOWEL OBSTRUCTION) (HCC): Status: RESOLVED | Noted: 2022-11-18 | Resolved: 2022-11-28

## 2022-11-28 PROBLEM — D72.829 LEUKOCYTOSIS: Status: RESOLVED | Noted: 2022-11-18 | Resolved: 2022-11-28

## 2022-11-28 PROBLEM — K56.2 VOLVULUS OF INTESTINE (HCC): Status: RESOLVED | Noted: 2022-11-25 | Resolved: 2022-11-28

## 2022-11-28 LAB
ALBUMIN SERPL BCP-MCNC: 2 G/DL (ref 3.2–4.9)
BUN SERPL-MCNC: 23 MG/DL (ref 8–22)
CALCIUM SERPL-MCNC: 7.4 MG/DL (ref 8.4–10.2)
CHLORIDE SERPL-SCNC: 112 MMOL/L (ref 96–112)
CO2 SERPL-SCNC: 21 MMOL/L (ref 20–33)
CREAT SERPL-MCNC: 0.7 MG/DL (ref 0.5–1.4)
ERYTHROCYTE [DISTWIDTH] IN BLOOD BY AUTOMATED COUNT: 45.2 FL (ref 35.9–50)
GFR SERPLBLD CREATININE-BSD FMLA CKD-EPI: 96 ML/MIN/1.73 M 2
GLUCOSE SERPL-MCNC: 91 MG/DL (ref 65–99)
HCT VFR BLD AUTO: 40.6 % (ref 42–52)
HGB BLD-MCNC: 13.5 G/DL (ref 14–18)
MAGNESIUM SERPL-MCNC: 2.1 MG/DL (ref 1.5–2.5)
MCH RBC QN AUTO: 32.5 PG (ref 27–33)
MCHC RBC AUTO-ENTMCNC: 33.3 G/DL (ref 33.7–35.3)
MCV RBC AUTO: 97.6 FL (ref 81.4–97.8)
PHOSPHATE SERPL-MCNC: 2.4 MG/DL (ref 2.5–4.5)
PLATELET # BLD AUTO: 306 K/UL (ref 164–446)
PMV BLD AUTO: 10.2 FL (ref 9–12.9)
POTASSIUM SERPL-SCNC: 3.4 MMOL/L (ref 3.6–5.5)
PROCALCITONIN SERPL-MCNC: 1.05 NG/ML
RBC # BLD AUTO: 4.16 M/UL (ref 4.7–6.1)
SODIUM SERPL-SCNC: 143 MMOL/L (ref 135–145)
WBC # BLD AUTO: 12.4 K/UL (ref 4.8–10.8)

## 2022-11-28 PROCEDURE — 36415 COLL VENOUS BLD VENIPUNCTURE: CPT

## 2022-11-28 PROCEDURE — 700102 HCHG RX REV CODE 250 W/ 637 OVERRIDE(OP): Performed by: INTERNAL MEDICINE

## 2022-11-28 PROCEDURE — 99239 HOSP IP/OBS DSCHRG MGMT >30: CPT | Performed by: INTERNAL MEDICINE

## 2022-11-28 PROCEDURE — 80069 RENAL FUNCTION PANEL: CPT

## 2022-11-28 PROCEDURE — 700105 HCHG RX REV CODE 258: Performed by: INTERNAL MEDICINE

## 2022-11-28 PROCEDURE — 94760 N-INVAS EAR/PLS OXIMETRY 1: CPT

## 2022-11-28 PROCEDURE — 85027 COMPLETE CBC AUTOMATED: CPT

## 2022-11-28 PROCEDURE — 83735 ASSAY OF MAGNESIUM: CPT

## 2022-11-28 PROCEDURE — 700111 HCHG RX REV CODE 636 W/ 250 OVERRIDE (IP): Performed by: INTERNAL MEDICINE

## 2022-11-28 PROCEDURE — 92610 EVALUATE SWALLOWING FUNCTION: CPT

## 2022-11-28 PROCEDURE — A9270 NON-COVERED ITEM OR SERVICE: HCPCS | Performed by: INTERNAL MEDICINE

## 2022-11-28 PROCEDURE — 84145 PROCALCITONIN (PCT): CPT

## 2022-11-28 RX ORDER — ONDANSETRON 4 MG/1
4 TABLET, ORALLY DISINTEGRATING ORAL EVERY 8 HOURS PRN
Qty: 30 TABLET | Refills: 0 | Status: SHIPPED | OUTPATIENT
Start: 2022-11-28 | End: 2022-12-08

## 2022-11-28 RX ORDER — AMOXICILLIN AND CLAVULANATE POTASSIUM 500; 125 MG/1; MG/1
1 TABLET, FILM COATED ORAL 3 TIMES DAILY
Qty: 30 TABLET | Refills: 0 | Status: SHIPPED | OUTPATIENT
Start: 2022-11-28 | End: 2022-12-08

## 2022-11-28 RX ORDER — OXYCODONE HYDROCHLORIDE 5 MG/1
5 TABLET ORAL EVERY 8 HOURS PRN
Qty: 15 TABLET | Refills: 0 | Status: SHIPPED | OUTPATIENT
Start: 2022-11-28 | End: 2022-12-03

## 2022-11-28 RX ORDER — AMOXICILLIN AND CLAVULANATE POTASSIUM 500; 125 MG/1; MG/1
1 TABLET, FILM COATED ORAL EVERY 8 HOURS
Status: DISCONTINUED | OUTPATIENT
Start: 2022-11-28 | End: 2022-11-28 | Stop reason: HOSPADM

## 2022-11-28 RX ADMIN — HEPARIN SODIUM 5000 UNITS: 5000 INJECTION, SOLUTION INTRAVENOUS; SUBCUTANEOUS at 13:38

## 2022-11-28 RX ADMIN — METRONIDAZOLE 500 MG: 500 TABLET ORAL at 13:38

## 2022-11-28 RX ADMIN — METRONIDAZOLE 500 MG: 500 TABLET ORAL at 05:02

## 2022-11-28 RX ADMIN — CLONAZEPAM 0.5 MG: 0.5 TABLET ORAL at 05:02

## 2022-11-28 RX ADMIN — DIVALPROEX SODIUM 250 MG: 250 TABLET, DELAYED RELEASE ORAL at 05:02

## 2022-11-28 RX ADMIN — CEFTRIAXONE SODIUM 2000 MG: 2 INJECTION, POWDER, FOR SOLUTION INTRAMUSCULAR; INTRAVENOUS at 05:01

## 2022-11-28 RX ADMIN — Medication 400 MG: at 05:02

## 2022-11-28 ASSESSMENT — ENCOUNTER SYMPTOMS
BLURRED VISION: 0
INSOMNIA: 0
COUGH: 0
DIZZINESS: 0
SHORTNESS OF BREATH: 0
EYE PAIN: 0
FEVER: 0
HEADACHES: 0
MEMORY LOSS: 0
WEAKNESS: 1
VOMITING: 0
NERVOUS/ANXIOUS: 0
COUGH: 1
ABDOMINAL PAIN: 0
NAUSEA: 0

## 2022-11-28 NOTE — PROGRESS NOTES
Received report from day shift nurse. Assumed pt care at 1915. Pt is A&Ox4, resting comfortably in bed. Pt on 2 lpm o No signs of SOB/respiratory distress. Labs noted, VSS. Pt c/o no pain at this moment. Fall precautions in place. Bed at lowest position. Call light and personal belongings within reach. Continue to monitor

## 2022-11-28 NOTE — THERAPY
"Speech Language Pathology   Clinical Swallow Evaluation     Patient Name: Hernán Walters  AGE:  77 y.o., SEX:  male  Medical Record #: [id number]  Today's Date: 11/28/2022     Precautions  Precautions: Fall Risk, Swallow Precautions ( See Comments)  Comments: NG tube    HPI:  76 y/o admitted on 11/10 for abdominal pain. Not seen by SLP before at [facility].    Dx chest 11/27: \"Interval removal of gastric drainage tube. Worsening right basilar atelectasis or infection.\"      Level of Consciousness: Alert  Affect/Behavior: Appropriate, Flat  Follows Directives: Yes  Hearing: Functional hearing  Vision: Wears corrective lenses      Prior Living Situation & Level of Function:  [place] Pt's spouse endorsed that pt has had a hx of cognitive impairment following brain aneurysm and recent difficulty swallowing 2/2 many teeth getting extracted.       Oral Mechanism Evaluation  Facial Symmetry: Equal  Labial Observations: WFL  Lingual Observations: Midline  Dentition: Poor, Missing posterior dentition  Comments:      Voice  Quality: WFL  Resonance: WFL  Intensity: Appropriate  Cough: WFL  Comments:      Current Method of Nutrition   Oral diet (soft and bite size/thin liquids)         Assessment  Positioning: Munson's (60-90 degrees)  Bolus Administration: Patient and Family member  Oxygen Requirements:  2 L Nasal Cannula  Factor(s) Affecting Performance: None    Swallowing Trials  Thin Liquid (TN0): WFL  Soft & Bite-Sized (SB6): Impaired    Comments:  No overt s/sx of aspiration noted with trials of pt's soft and bite size/thin liquid breakfast. Missing dentition resulted in prolonged mastication of meat but more timely with soft peach. Vocal quality clear following the swallow.        Clinical Impressions  Currently pt is presenting with an oral dysphagia likely subacute given hx of recent teeth extraction. Discussed current diet (soft and bite size) vs minced and moist vs puree diets with pt " "and ultimately pt expressed interested in ground meats and continuation of soft solids. Contacted kitchen rep for change in diet texture to soft and bite size with ground meats and thin liquids. Pt will benefit from encouragement to sit up at 90* or up in chair for meals.       Recommendations  1.  Soft and bite size diet with ground meats and thin liquids  2.  Instrumentation: None indicated at this time  3.  Swallowing Instructions & Precautions:   Supervision: Assist with meal tray set up  Positioning: Fully upright and midline during oral intake  Medication: Whole with liquid, Whole with puree  Strategies: Small bites/sips  Oral Care: Q2h         Plan    Recommend Speech Therapy 3 times per week until therapy goals are met for the following treatments:  Dysphagia Training and Patient / Family / Caregiver Education.    Discharge Recommendations: Anticipate that the patient will have no further speech therapy needs after discharge from the hospital         Objective       11/28/22 1052   Initial Contact Note    Initial Contact Note  Order Received and Verified, Speech Therapy Evaluation in Progress with Full Report to Follow.   Precautions   Precautions Fall Risk;Swallow Precautions ( See Comments)   Vitals   O2 (LPM) 2   O2 Delivery Device Silicone Nasal Cannula   Pain 0 - 10 Group   Therapist Pain Assessment Post Activity Pain Same as Prior to Activity;Nurse Notified;0   Prior Living Situation   Lives with - Patient's Self Care Capacity Spouse   Prior Level Of Function   Communication Impaired   Swallow Impaired   Dentition Poor Quality    Vision Wears Corrective Lenses   Patient / Family Goals   Patient / Family Goal #1 \"he has trouble chewing after teeth extraction\" - pt's wife   Short Term Goals   Short Term Goal # 1 Pt will consume a soft and bite size/thin liquid diet with ground meats and no overt s/sx of aspiration   Education Group   Education Provided Dysphagia   Dysphagia Patient Response " Patient;Family;Acceptance;Explanation;Verbal Demonstration;Reinforcement Needed   Anticipated Discharge Needs   Discharge Recommendations Anticipate that the patient will have no further speech therapy needs after discharge from the hospital   Therapy Recommendations Upon DC Dysphagia Training;Community Re-Integration;Patient / Family / Caregiver Education   Interdisciplinary Plan of Care Collaboration   IDT Collaboration with  Nursing;Family / Caregiver   Patient Position at End of Therapy Seated;In Bed;Family / Friend in Room

## 2022-11-28 NOTE — DISCHARGE PLANNING
Case Management Discharge Planning    Admission Date: 11/18/2022  GMLOS: 2.3  ALOS: 10    6-Clicks ADL Score: 17  6-Clicks Mobility Score: 18  PT and/or OT Eval ordered: Yes  Post-acute Referrals Ordered: Yes  Post-acute Choice Obtained: No  Has referral(s) been sent to post-acute provider:  NA      Anticipated Discharge Dispo: Discharge Disposition: Discharged to home/self care (01)    DME Needed: Yes    DME Ordered: Yes    Action(s) Taken: Updated Provider/Nurse on Discharge Plan    Anticipate DC home with OP PT/OT as RN CM is unable to find  agency that services patients area.  Patient and family aware of this.      Anticipated that patient will need home O2.  Pending walking O2 sat from bedside nursing.      1118: Patient has not had O2 in the past. RN CM colleted choice for [facility].  Choice faxed to [facility].     1240: IRINA DOWNEY called [facility] in [place] ([phone]) and spoke with [place] who states [facility] will be able to set patient up and then transfer service to [place] office. IRINA DOWNEY called [place] office and spoke with Tammy who states this is doable.  Tammy to review case and have O2 within 2 hours.      1310: RN CM received call from [facility] who states she is processing referral and will have DME delivered shortly/     Escalations Completed: None    Medically Clear: Yes    Next Steps: DME delivery     Barriers to Discharge: DME delivery

## 2022-11-28 NOTE — PROGRESS NOTES
Hospital Medicine Daily Progress Note    Date of Service  11/28/2022    Chief Complaint  Hernán Walters is a 77 y.o. male admitted 11/18/2022 with N/V and abdominal pain    Hospital Course  Hernán Walters is a 77 y.o. male who presented 11/18/2022 with abdominal pain.  Patient states his pain initially started approximately a week ago however has been worsening.  He states its in his entire abdomen, sharp at times, can be severe.  He also states he began having nausea and vomiting and can no longer keep anything down.  Because of the ongoing nature of this and the worsening nature, he presented to the ER at an outlying facility.  There they did imaging and noted a small bowel obstruction.  Patient was transferred here for higher level of care.  Patient noted no bleeding.  He does have a history of appendectomy and hemicolectomy approximately 4 years ago.  Patient states he did have a tooth pulled and has been antibiotics on this for approximately 7 days.  He was transferred from outside ER to here at [facility] for higher level of care.      S/p ex lap OR report shows volvulus with perforation, required resection and anastomosis with Dr. Shultz.    NGT removed on 11/26/22.    Interval Problem Update  Patient stated he feels better, but feels weaker. He requested to discharge home rather than pursue SNF. Wife at bedside mentioned there is no SNF locations within 80 miles from where they live.  Pending home oxygen approval     Could not get home out by 2PM, wife unable to drive back to [facility] at night.  Rx done and sent to [facility], wife supposed to   Change IV ABX to Augmentin.    I have discussed this patient's plan of care and discharge plan at IDT rounds today with Case Management, Nursing, Nursing leadership, and other members of the IDT team.    Consultants/Specialty  general surgery (Dr. Osborne, Dr. Shultz, Dr. Ziegler)    Code Status  Full Code    Disposition  Patient is not medically cleared for discharge.    Anticipate discharge to  TBD .  I have placed the appropriate orders for post-discharge needs.    Review of Systems  Review of Systems   Constitutional:  Negative for fever and malaise/fatigue.   HENT:  Negative for congestion and hearing loss.    Eyes:  Negative for blurred vision and pain.   Respiratory:  Negative for cough and shortness of breath.    Gastrointestinal:  Negative for abdominal pain, nausea and vomiting.   Genitourinary:  Negative for dysuria and hematuria.   Neurological:  Negative for dizziness and headaches.   Psychiatric/Behavioral:  Negative for memory loss. The patient is not nervous/anxious and does not have insomnia.    All other systems reviewed and are negative.     Physical Exam  Temp:  [36.5 °C (97.7 °F)-37.1 °C (98.7 °F)] 36.5 °C (97.7 °F)  Pulse:  [77-90] 90  Resp:  [18-20] 18  BP: ()/(60-77) 89/60  SpO2:  [90 %-94 %] 94 %    Physical Exam  Vitals and nursing note reviewed.   Constitutional:       General: He is not in acute distress.     Appearance: He is not ill-appearing.   HENT:      Head: Normocephalic and atraumatic.      Mouth/Throat:      Mouth: Mucous membranes are dry.      Pharynx: No oropharyngeal exudate.   Eyes:      General: No scleral icterus.     Extraocular Movements: Extraocular movements intact.   Cardiovascular:      Rate and Rhythm: Normal rate and regular rhythm.      Pulses: Normal pulses.      Heart sounds: Normal heart sounds. No murmur heard.  Pulmonary:      Effort: Pulmonary effort is normal. No respiratory distress.      Breath sounds: Rhonchi (bilateral, R>L) present.   Abdominal:      General: Bowel sounds are normal. There is no distension.      Tenderness: There is abdominal tenderness.      Comments: Midline surgical sites, covered with guaze, some blood seen. Staples intact.   Musculoskeletal:         General: No swelling or tenderness. Normal range of motion.   Skin:     General: Skin is warm.      Capillary Refill: Capillary refill takes  less than 2 seconds.      Coloration: Skin is not jaundiced or pale.   Neurological:      Mental Status: He is alert and oriented to person, place, and time. Mental status is at baseline.      Motor: No weakness.   Psychiatric:         Mood and Affect: Mood normal.         Behavior: Behavior normal.         Thought Content: Thought content normal.         Judgment: Judgment normal.       Fluids    Intake/Output Summary (Last 24 hours) at 11/28/2022 1547  Last data filed at 11/28/2022 1300  Gross per 24 hour   Intake 440 ml   Output 850 ml   Net -410 ml         Laboratory  Recent Labs     11/26/22  0642 11/27/22 0315 11/28/22  0340   WBC 12.9* 13.2* 12.4*   RBC 4.24* 3.96* 4.16*   HEMOGLOBIN 13.7* 13.1* 13.5*   HEMATOCRIT 42.0 39.2* 40.6*   MCV 99.1* 99.0* 97.6   MCH 32.3 33.1* 32.5   MCHC 32.6* 33.4* 33.3*   RDW 46.3 46.1 45.2   PLATELETCT 269 283 306   MPV 10.3 10.1 10.2       Recent Labs     11/26/22  0642 11/27/22 0315 11/28/22  0340   SODIUM 148* 144 143   POTASSIUM 3.6 3.5* 3.4*   CHLORIDE 115* 113* 112   CO2 20 22 21   GLUCOSE 89 101* 91   BUN 26* 24* 23*   CREATININE 0.82 0.76 0.70   CALCIUM 7.8* 7.3* 7.4*                       Imaging  DX-CHEST-PORTABLE (1 VIEW)   Final Result         Interval removal of gastric drainage tube.      Worsening right basilar atelectasis or infection.      DX-CHEST-PORTABLE (1 VIEW)   Final Result      1.  RIGHT lung base infiltrate or atelectasis, unchanged.   2.  No pleural fluid or pneumothorax.   3.  Supportive tubing as described above.      DX-ABDOMEN FOR TUBE PLACEMENT   Final Result      NG tube terminates over the stomach      Similar gaseous distention of small bowel loops concerning for distal obstruction      DX-ABDOMEN FOR TUBE PLACEMENT   Final Result      NG tube has been advanced and now terminates over the gastric fundus in good position      DX-ABDOMEN FOR TUBE PLACEMENT   Final Result      NG tube terminates abnormally high, over the distal esophagus and  there is continued gaseous distention of bowel loops      Voalte sent to and replied by HERNANDO RAMOS on 11/21/2022 1:36 PM.      DX-SMALL BOWEL SERIES   Final Result      1.  Difficult to definitively identify contrast within the colon 24 hour images due to dilution of contrast and suction of contrast from stomach.      2.  There is persistent dilated small bowel suggesting small bowel obstruction. Consider CT scan for further evaluation.      DX-ABDOMEN FOR TUBE PLACEMENT   Final Result      Enteric tube has been placed and the tip projects over the stomach.      UT-[id number] VIEW   Final Result      1.  Dilated loops of small bowel consistent with small bowel obstruction.             Assessment/Plan  * SBO (small bowel obstruction) (HCC)- (present on admission)  Assessment & Plan  Transferred from [facility] for SBO    S/p ex lap with Dr. Shultz on 11/23  -OR report shows volvulus with perforation, required resection and anastomosis    NGT removed. 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 Shultz on 11/23.  Caused patient's SBO on admission  WBC increased again. Continue IV ceftriaxone and IV flagyl.  NGT removed 11/26. Pt tolerating diet    Hypophosphatemia  Assessment & Plan  1.8, pt is npo  Replacing via IV with potassium  Recheck daily    2.5 stable today  When patient is able to resume diet, high concern for potential Refeeding  Syndrome    Hypomagnesemia  Assessment & Plan  1.8, pt npo  Replacing via IV  Recheck labs daily  Improving 1.9, monitoring. Replace via PO as patient is on psychiatric medications.    Hypokalemia  Assessment & Plan  Due to GI condition and suction  Recheck lab daily  Improved, monitor    Leukocytosis- (present on admission)  Assessment & Plan  - increased today after surgery and pt remains afebrile.  - OR report shows volvulus with perforation, required resection and anastomosis with Dr. Shultz.  - continue ceftriaxone and flagyl, transition to PO ABX prior to discharge. WBC remains elevated.  CXR reviewed, appears to be right lower lobe pneumonia likely aspiration pna    Dyslipidemia- (present on admission)  Assessment & Plan  - Restart home statin at home    Anxiety- (present on admission)  Assessment & Plan  - Is on Klonopin twice a day, patient is unsure whether that is as needed or scheduled  -Continue home klonopin    Seizure disorder (HCC)- (present on admission)  Assessment & Plan  Restarting home depakote         VTE prophylaxis: heparin ppx    I have performed a physical exam and reviewed and updated ROS and Plan today (11/28/2022). In review of yesterday's note (11/27/2022), there are no changes except as documented above.

## 2022-11-28 NOTE — DISCHARGE PLANNING
Referral sent per choice to [facility]    1306-  Agency/Facility Name: [facility]  Plan or Request: calling for a d/c update, DPA provided number to SHAYAN Bernal.

## 2022-11-28 NOTE — PROGRESS NOTES
Surgical Progress Note      Interval Events:  77-year-old male status post exploratory laparotomy and small bowel resection on 2022 for internal volvulus with necrotic small bowel and perforation identified    Patient feels good. Tolerating diet. Stooled. On room air. Pain controlled.  Ready for DC. Follow up with Dr. Shultz in 1 week        Review of Systems   Gastrointestinal:  Negative for abdominal pain.   Hemodynamics:  Temp (24hrs), Av.8 °C (98.3 °F), Min:36.6 °C (97.8 °F), Max:37.1 °C (98.7 °F)  Temperature: 36.8 °C (98.3 °F)  Pulse  Av.6  Min: 60  Max: 104   Blood Pressure : 111/74     Respiratory:    Respiration: 20, Pulse Oximetry: 91 %     Work Of Breathing / Effort: Mild  RUL Breath Sounds: Crackles, RML Breath Sounds: Crackles, RLL Breath Sounds: Diminished, LILIYA Breath Sounds: Crackles, LLL Breath Sounds: Diminished  Neuro:  GCS  15     Fluids:    Intake/Output Summary (Last 24 hours) at 2022 1028  Last data filed at 2022 0827  Gross per 24 hour   Intake 1000 ml   Output 925 ml   Net 75 ml        Current Diet Order   Procedures    Diet Order Diet: Level 6 - Soft and Bite Sized (no red meat, low fiber); Liquid level: Level 0 - Thin       Physical Exam  Cardiovascular:      Rate and Rhythm: Normal rate.   Pulmonary:      Effort: Pulmonary effort is normal.   Abdominal:      General: There is no distension.      Palpations: Abdomen is soft.      Tenderness: There is no abdominal tenderness.      Comments: Incision dry. Staples in place.   Musculoskeletal:      Cervical back: Neck supple.   Neurological:      Mental Status: He is alert.        Labs:  Recent Results (from the past 24 hour(s))   Renal Function Panel    Collection Time: 22  3:40 AM   Result Value Ref Range    Sodium 143 135 - 145 mmol/L    Potassium 3.4 (L) 3.6 - 5.5 mmol/L    Chloride 112 96 - 112 mmol/L    Co2 21 20 - 33 mmol/L    Glucose 91 65 - 99 mg/dL    Creatinine 0.70 0.50 - 1.40 mg/dL    Bun 23 (H) 8 -  22 mg/dL    Calcium 7.4 (L) 8.4 - 10.2 mg/dL    Phosphorus 2.4 (L) 2.5 - 4.5 mg/dL    Albumin 2.0 (L) 3.2 - 4.9 g/dL   MAGNESIUM    Collection Time: 11/28/22  3:40 AM   Result Value Ref Range    Magnesium 2.1 1.5 - 2.5 mg/dL   CBC WITHOUT DIFFERENTIAL    Collection Time: 11/28/22  3:40 AM   Result Value Ref Range    WBC 12.4 (H) 4.8 - 10.8 K/uL    RBC 4.16 (L) 4.70 - 6.10 M/uL    Hemoglobin 13.5 (L) 14.0 - 18.0 g/dL    Hematocrit 40.6 (L) 42.0 - 52.0 %    MCV 97.6 81.4 - 97.8 fL    MCH 32.5 27.0 - 33.0 pg    MCHC 33.3 (L) 33.7 - 35.3 g/dL    RDW 45.2 35.9 - 50.0 fL    Platelet Count 306 164 - 446 K/uL    MPV 10.2 9.0 - 12.9 fL   ESTIMATED GFR    Collection Time: 11/28/22  3:40 AM   Result Value Ref Range    GFR (CKD-EPI) 96 >60 mL/min/1.73 m 2   PROCALCITONIN    Collection Time: 11/28/22  3:40 AM   Result Value Ref Range    Procalcitonin 1.05 (H) <0.25 ng/mL     Medical Decision Making, by Problem:  Active Hospital Problems    Diagnosis     Aspiration pneumonia of right lower lobe due to gastric secretions (HCC) [J69.0]     Hypophosphatemia [E83.39]     Volvulus of intestine (HCC) [K56.2]     Hypomagnesemia [E83.42]     Hypokalemia [E87.6]     SBO (small bowel obstruction) (HCC) [K56.609]     Seizure disorder (HCC) [G40.909]     Anxiety [F41.9]     Dyslipidemia [E78.5]     Leukocytosis [D72.829]      Plan:  77-year-old male postop day 4 from exploratory laparotomy and small bowel resection for 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  Appreciate medical care  2.  As needed analgesia  3.  DVT prophylaxis  4.  GI soft diet  5.  DC today   6.  Follow-up with Dr. Shultz in approximately 1 to 2 weeks  7.  No heavy lifting (less than 15 pounds) for 6 weeks  8.  Patient may need PT/OT evaluation recommendations before discharge    Quality Measures:  Quality-Core Measures   Reviewed items::  Labs reviewed  Little catheter::  No Little  DVT prophylaxis - mechanical:  SCDs  Assessed for rehabilitation services:   Patient returned to prior level of function, rehabilitation not indicated at this time    Discussed patient condition with Patient

## 2022-11-28 NOTE — FACE TO FACE
"Face to Face Note  -  Durable Medical Equipment    Chase Patterson M.D. - NPI: [id number]  I certify that this patient is under my care and that they had a durable medical equipment(DME)face to face encounter by myself that meets the physician DME face-to-face encounter requirements with this patient on:    Date of encounter:   Patient:                    MRN:                       YOB: 2020  Hernán Walters  [id number]  1945     The encounter with the patient was in whole, or in part, for the following medical condition, which is the primary reason for durable medical equipment:  Post-Op Surgery and Other - aspiration pneumonia    I certify that, based on my findings, the following durable medical equipment is medically necessary:    Oxygen   HOME O2 Saturation Measurements:(Values must be present for Home Oxygen orders)  Room air sat at rest: 91  Room air sat with amb: 87  With liters of O2: 2, O2 sat at rest with O2: 91  With Liters of O2: 2, O2 sat with amb with O2 : 90  Is the patient mobile?: Yes  If patient feels more short of breath, they can go up to 6 liters per minute and contact healthcare provider.    Supporting Symptoms: The patient requires supplemental oxygen, as the following interventions have been tried with limited or no improvement: \"Ambulation with oximetry and \"Incentive spirometry and antibiotics.    My Clinical findings support the need for the above equipment due to:  Hypoxia  "

## 2022-11-28 NOTE — PROGRESS NOTES
Hospital Medicine Daily Progress Note    Date of Service  11/28/2022    Chief Complaint  Hernán Walters is a 77 y.o. male admitted 11/18/2022 with N/V and abdominal pain    Hospital Course  Hernán Walters is a 77 y.o. male who presented 11/18/2022 with abdominal pain.  Patient states his pain initially started approximately a week ago however has been worsening.  He states its in his entire abdomen, sharp at times, can be severe.  He also states he began having nausea and vomiting and can no longer keep anything down.  Because of the ongoing nature of this and the worsening nature, he presented to the ER at an outlying facility.  There they did imaging and noted a small bowel obstruction.  Patient was transferred here for higher level of care.  Patient noted no bleeding.  He does have a history of appendectomy and hemicolectomy approximately 4 years ago.  Patient states he did have a tooth pulled and has been antibiotics on this for approximately 7 days.  He was transferred from outside ER to here at [facility] for higher level of care.      S/p ex lap OR report shows volvulus with perforation, required resection and anastomosis with Dr. Shultz.    NGT removed on 11/26/22.    Interval Problem Update  Patient stated he feels better, but feels weaker. He requested to discharge home rather than pursue SNF. Wife at bedside mentioned there is no SNF locations within 80 miles from where they live.  Pending home oxygen approval    Could not get home out by 2PM, wife unable to drive back to [facility] at night.  Rx done and sent to hospitals, wife supposed to   Change IV ABX to Augmentin.    I have discussed this patient's plan of care and discharge plan at IDT rounds today with Case Management, Nursing, Nursing leadership, and other members of the IDT team.    Consultants/Specialty  general surgery (Dr. Osborne, Dr. Shultz, Dr. Ziegler)    Code Status  Full Code    Disposition  Patient is not medically cleared for discharge.    Anticipate discharge to  D .  I have placed the appropriate orders for post-discharge needs.    Review of Systems  Review of Systems   Constitutional:  Negative for fever and malaise/fatigue.   HENT:  Negative for congestion and hearing loss.    Eyes:  Negative for blurred vision and pain.   Respiratory:  Positive for cough. Negative for shortness of breath.    Gastrointestinal:  Negative for abdominal pain, nausea and vomiting.   Genitourinary:  Negative for dysuria and hematuria.   Neurological:  Positive for weakness. Negative for dizziness and headaches.   Psychiatric/Behavioral:  Negative for memory loss. The patient is not nervous/anxious and does not have insomnia.    All other systems reviewed and are negative.     Physical Exam  Temp:  [36.5 °C (97.7 °F)-37.1 °C (98.7 °F)] 36.5 °C (97.7 °F)  Pulse:  [77-90] 90  Resp:  [18-20] 18  BP: ()/(60-77) 89/60  SpO2:  [90 %-94 %] 94 %    Physical Exam  Vitals and nursing note reviewed.   Constitutional:       General: He is not in acute distress.     Appearance: He is not ill-appearing.   HENT:      Head: Normocephalic and atraumatic.      Mouth/Throat:      Mouth: Mucous membranes are dry.      Pharynx: No oropharyngeal exudate.   Eyes:      General: No scleral icterus.     Extraocular Movements: Extraocular movements intact.   Cardiovascular:      Rate and Rhythm: Normal rate and regular rhythm.      Pulses: Normal pulses.      Heart sounds: Normal heart sounds. No murmur heard.  Pulmonary:      Effort: Pulmonary effort is normal. No respiratory distress.      Breath sounds: Rhonchi (bilateral, R>L) present. No wheezing.   Abdominal:      General: Bowel sounds are normal. There is no distension.      Tenderness: There is abdominal tenderness.      Comments: Midline surgical sites, covered with guaze, some blood seen. Staples intact.   Musculoskeletal:         General: No swelling or tenderness. Normal range of motion.   Skin:     General: Skin is warm.       Capillary Refill: Capillary refill takes less than 2 seconds.      Coloration: Skin is not jaundiced or pale.   Neurological:      Mental Status: He is alert and oriented to person, place, and time. Mental status is at baseline.      Motor: No weakness.   Psychiatric:         Mood and Affect: Mood normal.         Behavior: Behavior normal.         Thought Content: Thought content normal.         Judgment: Judgment normal.       Fluids    Intake/Output Summary (Last 24 hours) at 11/28/2022 1526  Last data filed at 11/28/2022 1300  Gross per 24 hour   Intake 440 ml   Output 850 ml   Net -410 ml       Laboratory  Recent Labs     11/26/22  0642 11/27/22 0315 11/28/22  0340   WBC 12.9* 13.2* 12.4*   RBC 4.24* 3.96* 4.16*   HEMOGLOBIN 13.7* 13.1* 13.5*   HEMATOCRIT 42.0 39.2* 40.6*   MCV 99.1* 99.0* 97.6   MCH 32.3 33.1* 32.5   MCHC 32.6* 33.4* 33.3*   RDW 46.3 46.1 45.2   PLATELETCT 269 283 306   MPV 10.3 10.1 10.2     Recent Labs     11/26/22  0642 11/27/22 0315 11/28/22  0340   SODIUM 148* 144 143   POTASSIUM 3.6 3.5* 3.4*   CHLORIDE 115* 113* 112   CO2 20 22 21   GLUCOSE 89 101* 91   BUN 26* 24* 23*   CREATININE 0.82 0.76 0.70   CALCIUM 7.8* 7.3* 7.4*                     Imaging  DX-CHEST-PORTABLE (1 VIEW)   Final Result         Interval removal of gastric drainage tube.      Worsening right basilar atelectasis or infection.      DX-CHEST-PORTABLE (1 VIEW)   Final Result      1.  RIGHT lung base infiltrate or atelectasis, unchanged.   2.  No pleural fluid or pneumothorax.   3.  Supportive tubing as described above.      DX-ABDOMEN FOR TUBE PLACEMENT   Final Result      NG tube terminates over the stomach      Similar gaseous distention of small bowel loops concerning for distal obstruction      DX-ABDOMEN FOR TUBE PLACEMENT   Final Result      NG tube has been advanced and now terminates over the gastric fundus in good position      DX-ABDOMEN FOR TUBE PLACEMENT   Final Result      NG tube terminates abnormally  high, over the distal esophagus and there is continued gaseous distention of bowel loops      Voalte sent to and replied by HERNANDO RAMOS on 11/21/2022 1:36 PM.      DX-SMALL BOWEL SERIES   Final Result      1.  Difficult to definitively identify contrast within the colon 24 hour images due to dilution of contrast and suction of contrast from stomach.      2.  There is persistent dilated small bowel suggesting small bowel obstruction. Consider CT scan for further evaluation.      DX-ABDOMEN FOR TUBE PLACEMENT   Final Result      Enteric tube has been placed and the tip projects over the stomach.      [id number] VIEW   Final Result      1.  Dilated loops of small bowel consistent with small bowel obstruction.             Assessment/Plan  * SBO (small bowel obstruction) (HCC)- (present on admission)  Assessment & Plan  Transferred from [facility] for SBO    S/p ex lap with Dr. Shultz on 11/23  -OR report shows volvulus with perforation, required resection and anastomosis    NGT removed. 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 Shultz on 11/23.  Caused patient's SBO on admission  WBC increased again. Continue IV ceftriaxone and IV flagyl.  NGT removed 11/26. Pt tolerating diet    Hypophosphatemia  Assessment & Plan  1.8, pt is npo  Replacing via IV with potassium  Recheck daily    2.5 stable today  When patient is able to resume diet, high concern for potential  Refeeding Syndrome    Hypomagnesemia  Assessment & Plan  1.8, pt npo  Replacing via IV  Recheck labs daily  Improving 1.9, monitoring. Replace via PO as patient is on psychiatric medications.    Hypokalemia  Assessment & Plan  Due to GI condition and suction  Recheck lab daily  Improved, monitor    Leukocytosis- (present on admission)  Assessment & Plan  - increased today after surgery and pt remains afebrile.  - OR report shows volvulus with perforation, required resection and anastomosis with Dr. Shultz.  - continue ceftriaxone and flagyl, transition to PO ABX prior to discharge. WBC remains elevated.  CXR reviewed, appears to be right lower lobe pneumonia likely aspiration pna    Dyslipidemia- (present on admission)  Assessment & Plan  - Restart home statin at home    Anxiety- (present on admission)  Assessment & Plan  - Is on Klonopin twice a day, patient is unsure whether that is as needed or scheduled  -Continue home klonopin    Seizure disorder (HCC)- (present on admission)  Assessment & Plan  Restarting home depakote     VTE prophylaxis: heparin ppx    I have performed a physical exam and reviewed and updated ROS and Plan today (11/28/2022). In review of yesterday's note (11/27/2022), there are no changes except as documented above.

## 2022-11-28 NOTE — CARE PLAN
The patient is Stable - Low risk of patient condition declining or worsening    Shift Goals  Clinical Goals: sleep at least 8 hours  Patient Goals: sleep at least 8 hours  Family Goals: NA    Progress made toward(s) clinical / shift goals:  Pt seen sleeping in between rounds. Call light within reach.Fall precautions in placed.     Patient is not progressing towards the following goals:N/A

## 2022-11-28 NOTE — DIETARY
Nutrition Services Brief Update:    Day 10 of admit.  Hernán Walters is a 77 y.o. male with admitting DX of SBO (small bowel obstruction) (Formerly [facility]) [K56.386]    Current Diet: Level 6-soft and bite sized  with Level 0 - thin liquids     Diet advanced beyond clears. PO intake <25% to 25-50% currently. Most likely intake is not adequate yet.    Per Surgeon's note, plan is to discharge today    Problem: Nutritional:  Goal: Achieve adequate nutritional intake  Description: Diet > clears. Patient will consume >50% of meals  Outcome: progressing.    RD will follow.

## 2022-11-28 NOTE — DISCHARGE SUMMARY
Discharge Summary    CHIEF COMPLAINT ON ADMISSION  No chief complaint on file.      Reason for Admission  Acute partial SBO     Admission Date  11/18/2022    CODE STATUS  Full Code    HPI & HOSPITAL COURSE  This is Hernán Watlers is a 77 y.o. male who presented 11/18/2022 with abdominal pain.  Patient states his pain initially started approximately a week ago however has been worsening.  He states its in his entire abdomen, sharp at times, can be severe.  He also states he began having nausea and vomiting and can no longer keep anything down.  Because of the ongoing nature of this and the worsening nature, he presented to the ER at an outlying facility.  There they did imaging and noted a small bowel obstruction.  Patient was transferred here for higher level of care.  Patient noted no bleeding.  He does have a history of appendectomy and hemicolectomy approximately 4 years ago.  Patient states he did have a tooth pulled and has been antibiotics on this for approximately 7 days.  He was transferred from outside ER to here at [facility] for higher level of care.      Patient had ongoing small bowel obstruction, unfortunately he had removed his NG tube several times.  Initially he was confused and very anxious.  We had multiple family conversations in regards to Mr. Walters's care.  Unfortunately his small bowel traction did not resolve on its own.  He required surgical intervention. S/p ex lap OR report shows volvulus with perforation, required resection and anastomosis with Dr. Shultz on 11/23.      Patient has slow improvements but was doing well with his prior weakness.  He did require NG tube further after surgery but was able to be removed on 11/26/22.    Patient unfortunately developed aspiration pneumonia after having self-removed multiple NGTs and vomiting. He was prescribed Augmentin for discharge for 10 days. I have counseled patient and wife to return to PCP as soon as possible, to reevaluate for aspiration  pneumonia and further antibiotics if needed.  I did explain to them that unfortunately as they are returning Following he will need to return to the hospital if he has any worsening symptoms, as they explained there is very limited resources in their area.    Therefore, he is discharged in fair and stable condition to home with close outpatient follow-up.    The patient met 2-midnight criteria for an inpatient stay at the time of discharge.    Discharge Date  11/28/22    FOLLOW UP ITEMS POST DISCHARGE  With PCP    DISCHARGE DIAGNOSES  Principal Problem:    SBO (small bowel obstruction) (Formerly [facility]) POA: Yes  Active Problems:    Aspiration pneumonia of right lower lobe due to gastric secretions (HCC) POA: Clinically Undetermined    Seizure disorder (HCC) POA: Yes    Anxiety POA: Yes    Dyslipidemia POA: Yes    Leukocytosis POA: Yes    Hypokalemia POA: Clinically Undetermined    Hypomagnesemia POA: Clinically Undetermined    Hypophosphatemia POA: Clinically Undetermined  Resolved Problems:    Volvulus of intestine (HCC) POA: Yes      FOLLOW UP  No future appointments.  Junior Shultz M.D.  [address] [place] [id number] [phone]    Follow up in 1 week(s)        MEDICATIONS ON DISCHARGE     Medication List        ASK your doctor about these medications        Instructions   amoxicillin 500 MG Caps  Commonly known as: AMOXIL   Take 1 Capsule by mouth 3 times a day. Indications: Periodontitis  Dose: 500 mg     aspirin EC 81 MG Tbec  Commonly known as: ECOTRIN   Take 1 Tablet by mouth every day.  Dose: 81 mg     atorvastatin 20 MG Tabs  Commonly known as: LIPITOR   Take 1 Tablet by mouth every evening.  Dose: 20 mg     chlorhexidine 0.12 % Soln  Commonly known as: PERIDEX   Take 15 mL by mouth 2 times a day.  Dose: 15 mL     clonazePAM 0.5 MG Tabs  Commonly known as: KLONOPIN   Take 1 Tablet by mouth 2 times a day.  Dose: 0.5 mg     divalproex 250 MG Tbec  Commonly known as: DEPAKOTE   Take 1 Tablet by mouth  "every day.  Dose: 250 mg     HYDROcodone-acetaminophen 5-325 MG Tabs per tablet  Commonly known as: NORCO   Take 1 Tablet by mouth every 6 hours as needed.  Dose: 1 Tablet              Allergies  Allergies   Allergen Reactions    Carbamazepine Hives       DIET  Orders Placed This Encounter   Procedures    Diet Order Diet: Level 6 - Soft and Bite Sized (no red meat, low fiber, ground meats); Liquid level: Level 0 - Thin     Standing Status:   Standing     Number of Occurrences:   1     Order Specific Question:   Diet:     Answer:   Level 6 - Soft and Bite Sized [23]     Comments:   no red meat, low fiber, ground meats     Order Specific Question:   Liquid level     Answer:   Level 0 - Thin       ACTIVITY  As tolerated. With walker  Weight bearing as tolerated    CONSULTATIONS  General Surgery    PROCEDURES  \"Diagnostic laparoscopy, exploratory laparotomy, extensive lysis of adhesions totaling 45 minutes, small bowel resection\" with Dr. Shultz on 11/23    LABORATORY  Lab Results   Component Value Date    SODIUM 143 11/28/2022    POTASSIUM 3.4 (L) 11/28/2022    CHLORIDE 112 11/28/2022    CO2 21 11/28/2022    GLUCOSE 91 11/28/2022    BUN 23 (H) 11/28/2022    CREATININE 0.70 11/28/2022        Lab Results   Component Value Date    WBC 12.4 (H) 11/28/2022    HEMOGLOBIN 13.5 (L) 11/28/2022    HEMATOCRIT 40.6 (L) 11/28/2022    PLATELETCT 306 11/28/2022        Total time of the discharge process exceeds 32 minutes.  More than 50% of time was spent face to face with patient.  This included but not limited to review of hospital course with patient, treatment goals upon discharge, recommendations to PCP, continued and new medications and their adverse reactions, and nursing instructions for patient.          DX-CHEST-PORTABLE (1 VIEW)   Final Result         Interval removal of gastric drainage tube.      Worsening right basilar atelectasis or infection.      DX-CHEST-PORTABLE (1 VIEW)   Final Result      1.  RIGHT lung base " infiltrate or atelectasis, unchanged.   2.  No pleural fluid or pneumothorax.   3.  Supportive tubing as described above.      DX-ABDOMEN FOR TUBE PLACEMENT   Final Result      NG tube terminates over the stomach      Similar gaseous distention of small bowel loops concerning for distal obstruction      DX-ABDOMEN FOR TUBE PLACEMENT   Final Result      NG tube has been advanced and now terminates over the gastric fundus in good position      DX-ABDOMEN FOR TUBE PLACEMENT   Final Result      NG tube terminates abnormally high, over the distal esophagus and there is continued gaseous distention of bowel loops      Voalte sent to and replied by HERNANDO RAMOS on 11/21/2022 1:36 PM.      DX-SMALL BOWEL SERIES   Final Result      1.  Difficult to definitively identify contrast within the colon 24 hour images due to dilution of contrast and suction of contrast from stomach.      2.  There is persistent dilated small bowel suggesting small bowel obstruction. Consider CT scan for further evaluation.      DX-ABDOMEN FOR TUBE PLACEMENT   Final Result      Enteric tube has been placed and the tip projects over the stomach.      [id number] VIEW   Final Result      1.  Dilated loops of small bowel consistent with small bowel obstruction.

## 2022-12-20 NOTE — PROGRESS NOTES
Received pt report from Day RN. Assumed care pt at 1915. Pt is resting comfortably in bed, alert, AxO4 in RA. Pt reported pain, medicated per MAR. Discussed POC. Safety precaution in place, call light within reach. Continue to monitor.    Attending Attestation (For Attendings USE Only)...

## (undated) DEVICE — LACTATED RINGERS INJ 1000 ML - (14EA/CA 60CA/PF)

## (undated) DEVICE — DRAPE LAPAROTOMY T SHEET - (12EA/CA)

## (undated) DEVICE — STAPLE 75MM LINEAR (12EA/BX)

## (undated) DEVICE — SUTURE 0 PDS-2 CTX 36 INCH - (24/BX)

## (undated) DEVICE — TUBING FILTER STRYKER

## (undated) DEVICE — LIGASURE TISSUE FUSION  - SINGLE USE (6/CA)

## (undated) DEVICE — STAPLER 60MM OPEN GREEN 4.8 - W/STAPLE (3/BX)

## (undated) DEVICE — BOVIE  BLADE 6 EXTENDED - (50/PK)

## (undated) DEVICE — TROCAR 5X100 NON BLADED Z-TH - READ KII (6/BX)

## (undated) DEVICE — RESERVOIR SUCTION 100 CC - SILICONE (20EA/CA)

## (undated) DEVICE — STAPLER SKIN DISP - (6/BX 10BX/CA) VISISTAT

## (undated) DEVICE — CANNULA W/SEAL 5X100 Z-THRE - ADED KII (12/BX)

## (undated) DEVICE — CLIP MED LG INTNL HRZN TI ESCP - (20/BX)

## (undated) DEVICE — GLOVE BIOGEL INDICATOR SZ 7.5 SURGICAL PF LTX - (50PR/BX 4BX/CA)

## (undated) DEVICE — SPONGE PEANUT - (5/PK 50PK/CA)

## (undated) DEVICE — KIT RADIAL ARTERY 20GA W/MAX BARRIER AND BIOPATCH  (5EA/CA) #10740 IS FOR THE SET RADIAL ARTERIAL

## (undated) DEVICE — CLIP LG INTNL HRZN TI ESCP LGT - (20/BX)

## (undated) DEVICE — RELOAD WITH GRIPPING SURFACE TECHNOLOGY BLUE 60MM (12EA/BX)

## (undated) DEVICE — SUTURE 2-0 VICRYL PLUS SH - 27 INCH (36/BX)

## (undated) DEVICE — SUTURE 3-0 SILK SH C/R 18 IN - (12/BX)

## (undated) DEVICE — Device

## (undated) DEVICE — TRAY CATHETER FOLEY URINE METER W/STATLOCK 350ML (10EA/CA)

## (undated) DEVICE — STAPLE 45MM BLUE 3.5MM ECHELON (12EA/BX)

## (undated) DEVICE — CLIP MED INTNL HRZN TI ESCP - (25/BX)

## (undated) DEVICE — TUBE CONNECTING SUCTION - CLEAR PLASTIC STERILE 72 IN (50EA/CA)

## (undated) DEVICE — GLOVE BIOGEL INDICATOR SZ 8 SURGICAL PF LTX - (50/BX 4BX/CA)

## (undated) DEVICE — STAPLE 60MM BLUE 3.5MM - ECHELON (12/BX)

## (undated) DEVICE — SPONGE GAUZESTER 4 X 4 4PLY - (128PK/CA)

## (undated) DEVICE — GLOVE BIOGEL PI INDICATOR SZ 8.0 SURGICAL PF LF -(50/BX 4BX/CA)

## (undated) DEVICE — SUTURE 4-0 MONOCRYL PLUS PS-2 - 27 INCH (36/BX)

## (undated) DEVICE — BAG SPONGE COUNT 10.25 X 32 - BLUE (250/CA)

## (undated) DEVICE — STAPLER 75MM LINEAR OPEN (3EA/BX)

## (undated) DEVICE — TROCAR Z THREAD11MM OPTICAL - NON BLADED(6/BX)

## (undated) DEVICE — SUTURE GENERAL

## (undated) DEVICE — GOWN SURGEONS X-LARGE - DISP. (30/CA)

## (undated) DEVICE — SUTURE 2-0 COATED VICRYL PLUS - 12 X 18 INCH (12/BX)

## (undated) DEVICE — BLADE SURGICAL #15 - (50/BX 3BX/CA)

## (undated) DEVICE — TUBE E-T HI-LO CUFF 7.0MM (10EA/PK)

## (undated) DEVICE — SUTURE 3-0 VICRYL PLUS SH - 8X 18 INCH (12/BX)

## (undated) DEVICE — TOWEL STOP TIMEOUT SAFETY FLAG (40EA/CA)

## (undated) DEVICE — DRAIN J-VAC 10MM FLAT - (10/CA)

## (undated) DEVICE — PACK MAJOR BASIN - (2EA/CA)

## (undated) DEVICE — TUBE NG SALEM SUMP 16FR (50EA/CA)

## (undated) DEVICE — GLOVE SZ 7.5 BIOGEL PI MICRO - PF LF (50PR/BX)

## (undated) DEVICE — SUTURE 2-0 SILK SH 24 (36PK/BX)"

## (undated) DEVICE — SUTURE 1 VICRYL PLUS CTX - 36 INCH (36/BX)

## (undated) DEVICE — PAD LAP STERILE 18 X 18 - (5/PK 40PK/CA)

## (undated) DEVICE — STAPLER POWERED 60MM (3EA/BX)

## (undated) DEVICE — SUTURE 0 COATED VICRYL 6-18IN - (12PK/BX)

## (undated) DEVICE — ELECTRODE DUAL RETURN W/ CORD - (50/PK)

## (undated) DEVICE — CHLORAPREP 26 ML APPLICATOR - ORANGE TINT(25/CA)

## (undated) DEVICE — DRAPE MAYO STAND - (30/CA)

## (undated) DEVICE — SODIUM CHL IRRIGATION 0.9% 1000ML (12EA/CA)

## (undated) DEVICE — GRAFT MESH SEPRAFILM PRO PACK - 5/BX CONTAINS 6 3X5 PIECES

## (undated) DEVICE — STAPLER POWERED 45MM (3EA/BX)

## (undated) DEVICE — TOWELS CLOTH SURGICAL - (4/PK 20PK/CA)

## (undated) DEVICE — GLOVE BIOGEL PI ULTRATOUCH SZ 7.5 SURGICAL PF LF -(50/BX 4BX/CA)